# Patient Record
Sex: MALE | Race: WHITE | URBAN - METROPOLITAN AREA
[De-identification: names, ages, dates, MRNs, and addresses within clinical notes are randomized per-mention and may not be internally consistent; named-entity substitution may affect disease eponyms.]

---

## 2017-10-18 ENCOUNTER — OUTPATIENT (OUTPATIENT)
Dept: OUTPATIENT SERVICES | Facility: HOSPITAL | Age: 77
LOS: 1 days | Discharge: HOME | End: 2017-10-18

## 2017-10-18 DIAGNOSIS — J44.9 CHRONIC OBSTRUCTIVE PULMONARY DISEASE, UNSPECIFIED: ICD-10-CM

## 2017-10-18 DIAGNOSIS — I24.0 ACUTE CORONARY THROMBOSIS NOT RESULTING IN MYOCARDIAL INFARCTION: ICD-10-CM

## 2017-10-18 DIAGNOSIS — I25.10 ATHEROSCLEROTIC HEART DISEASE OF NATIVE CORONARY ARTERY WITHOUT ANGINA PECTORIS: ICD-10-CM

## 2017-10-18 DIAGNOSIS — I77.9 DISORDER OF ARTERIES AND ARTERIOLES, UNSPECIFIED: ICD-10-CM

## 2017-10-18 DIAGNOSIS — K92.2 GASTROINTESTINAL HEMORRHAGE, UNSPECIFIED: ICD-10-CM

## 2017-10-19 DIAGNOSIS — L97.521 NON-PRESSURE CHRONIC ULCER OF OTHER PART OF LEFT FOOT LIMITED TO BREAKDOWN OF SKIN: ICD-10-CM

## 2017-12-28 ENCOUNTER — TRANSCRIPTION ENCOUNTER (OUTPATIENT)
Age: 77
End: 2017-12-28

## 2017-12-28 PROBLEM — Z00.00 ENCOUNTER FOR PREVENTIVE HEALTH EXAMINATION: Status: ACTIVE | Noted: 2017-12-28

## 2018-01-17 ENCOUNTER — OUTPATIENT (OUTPATIENT)
Dept: OUTPATIENT SERVICES | Facility: HOSPITAL | Age: 78
LOS: 1 days | Discharge: HOME | End: 2018-01-17

## 2018-01-17 DIAGNOSIS — E11.9 TYPE 2 DIABETES MELLITUS WITHOUT COMPLICATIONS: ICD-10-CM

## 2018-01-17 DIAGNOSIS — I25.10 ATHEROSCLEROTIC HEART DISEASE OF NATIVE CORONARY ARTERY WITHOUT ANGINA PECTORIS: ICD-10-CM

## 2018-01-17 DIAGNOSIS — I73.9 PERIPHERAL VASCULAR DISEASE, UNSPECIFIED: ICD-10-CM

## 2018-01-17 DIAGNOSIS — K92.2 GASTROINTESTINAL HEMORRHAGE, UNSPECIFIED: ICD-10-CM

## 2018-01-17 DIAGNOSIS — I77.9 DISORDER OF ARTERIES AND ARTERIOLES, UNSPECIFIED: ICD-10-CM

## 2018-01-17 DIAGNOSIS — I24.0 ACUTE CORONARY THROMBOSIS NOT RESULTING IN MYOCARDIAL INFARCTION: ICD-10-CM

## 2018-01-17 DIAGNOSIS — J44.9 CHRONIC OBSTRUCTIVE PULMONARY DISEASE, UNSPECIFIED: ICD-10-CM

## 2018-03-07 ENCOUNTER — APPOINTMENT (OUTPATIENT)
Dept: PLASTIC SURGERY | Facility: CLINIC | Age: 78
End: 2018-03-07

## 2018-03-28 ENCOUNTER — OUTPATIENT (OUTPATIENT)
Dept: OUTPATIENT SERVICES | Facility: HOSPITAL | Age: 78
LOS: 1 days | Discharge: HOME | End: 2018-03-28

## 2018-03-28 DIAGNOSIS — E11.9 TYPE 2 DIABETES MELLITUS WITHOUT COMPLICATIONS: ICD-10-CM

## 2018-05-24 ENCOUNTER — OUTPATIENT (OUTPATIENT)
Dept: OUTPATIENT SERVICES | Facility: HOSPITAL | Age: 78
LOS: 1 days | Discharge: HOME | End: 2018-05-24

## 2018-05-24 DIAGNOSIS — K52.9 NONINFECTIVE GASTROENTERITIS AND COLITIS, UNSPECIFIED: ICD-10-CM

## 2018-08-22 ENCOUNTER — OUTPATIENT (OUTPATIENT)
Dept: OUTPATIENT SERVICES | Facility: HOSPITAL | Age: 78
LOS: 1 days | Discharge: HOME | End: 2018-08-22

## 2018-08-22 DIAGNOSIS — E11.9 TYPE 2 DIABETES MELLITUS WITHOUT COMPLICATIONS: ICD-10-CM

## 2018-08-22 DIAGNOSIS — J20.9 ACUTE BRONCHITIS, UNSPECIFIED: ICD-10-CM

## 2018-12-28 ENCOUNTER — OUTPATIENT (OUTPATIENT)
Dept: OUTPATIENT SERVICES | Facility: HOSPITAL | Age: 78
LOS: 1 days | Discharge: HOME | End: 2018-12-28

## 2018-12-28 DIAGNOSIS — Z01.818 ENCOUNTER FOR OTHER PREPROCEDURAL EXAMINATION: ICD-10-CM

## 2019-08-09 ENCOUNTER — OUTPATIENT (OUTPATIENT)
Dept: OUTPATIENT SERVICES | Facility: HOSPITAL | Age: 79
LOS: 1 days | Discharge: HOME | End: 2019-08-09

## 2019-08-09 DIAGNOSIS — D64.9 ANEMIA, UNSPECIFIED: ICD-10-CM

## 2019-08-09 DIAGNOSIS — Z00.00 ENCOUNTER FOR GENERAL ADULT MEDICAL EXAMINATION WITHOUT ABNORMAL FINDINGS: ICD-10-CM

## 2019-08-09 DIAGNOSIS — E11.9 TYPE 2 DIABETES MELLITUS WITHOUT COMPLICATIONS: ICD-10-CM

## 2019-09-06 ENCOUNTER — OUTPATIENT (OUTPATIENT)
Dept: OUTPATIENT SERVICES | Facility: HOSPITAL | Age: 79
LOS: 1 days | Discharge: HOME | End: 2019-09-06

## 2019-09-06 DIAGNOSIS — E87.5 HYPERKALEMIA: ICD-10-CM

## 2019-09-13 ENCOUNTER — APPOINTMENT (OUTPATIENT)
Dept: OPHTHALMOLOGY | Facility: CLINIC | Age: 79
End: 2019-09-13
Payer: MEDICARE

## 2019-09-13 ENCOUNTER — NON-APPOINTMENT (OUTPATIENT)
Age: 79
End: 2019-09-13

## 2019-09-13 PROCEDURE — 92002 INTRM OPH EXAM NEW PATIENT: CPT

## 2019-10-24 ENCOUNTER — APPOINTMENT (OUTPATIENT)
Dept: OPHTHALMOLOGY | Facility: CLINIC | Age: 79
End: 2019-10-24
Payer: MEDICARE

## 2019-10-24 ENCOUNTER — NON-APPOINTMENT (OUTPATIENT)
Age: 79
End: 2019-10-24

## 2019-10-24 PROCEDURE — 92012 INTRM OPH EXAM EST PATIENT: CPT

## 2020-01-09 ENCOUNTER — NON-APPOINTMENT (OUTPATIENT)
Age: 80
End: 2020-01-09

## 2020-01-09 ENCOUNTER — APPOINTMENT (OUTPATIENT)
Dept: OPHTHALMOLOGY | Facility: CLINIC | Age: 80
End: 2020-01-09
Payer: MEDICARE

## 2020-01-09 PROCEDURE — 92012 INTRM OPH EXAM EST PATIENT: CPT

## 2020-05-11 ENCOUNTER — APPOINTMENT (OUTPATIENT)
Dept: OPHTHALMOLOGY | Facility: CLINIC | Age: 80
End: 2020-05-11

## 2021-07-08 ENCOUNTER — NON-APPOINTMENT (OUTPATIENT)
Age: 81
End: 2021-07-08

## 2021-07-08 ENCOUNTER — APPOINTMENT (OUTPATIENT)
Dept: OPHTHALMOLOGY | Facility: CLINIC | Age: 81
End: 2021-07-08
Payer: MEDICARE

## 2021-07-08 PROCEDURE — 99072 ADDL SUPL MATRL&STAF TM PHE: CPT

## 2021-07-08 PROCEDURE — 92250 FUNDUS PHOTOGRAPHY W/I&R: CPT

## 2021-07-08 PROCEDURE — 92286 ANT SGM IMG I&R SPECLR MIC: CPT

## 2021-07-08 PROCEDURE — 92014 COMPRE OPH EXAM EST PT 1/>: CPT

## 2021-10-07 ENCOUNTER — NON-APPOINTMENT (OUTPATIENT)
Age: 81
End: 2021-10-07

## 2021-10-07 ENCOUNTER — APPOINTMENT (OUTPATIENT)
Dept: OPHTHALMOLOGY | Facility: CLINIC | Age: 81
End: 2021-10-07
Payer: MEDICARE

## 2021-10-07 PROCEDURE — 92012 INTRM OPH EXAM EST PATIENT: CPT

## 2022-01-18 ENCOUNTER — FORM ENCOUNTER (OUTPATIENT)
Age: 82
End: 2022-01-18

## 2022-02-17 ENCOUNTER — OUTPATIENT (OUTPATIENT)
Dept: OUTPATIENT SERVICES | Facility: HOSPITAL | Age: 82
LOS: 1 days | Discharge: HOME | End: 2022-02-17
Payer: MEDICARE

## 2022-02-17 ENCOUNTER — TRANSCRIPTION ENCOUNTER (OUTPATIENT)
Age: 82
End: 2022-02-17

## 2022-02-17 ENCOUNTER — OUTPATIENT (OUTPATIENT)
Dept: OUTPATIENT SERVICES | Facility: HOSPITAL | Age: 82
LOS: 1 days | Discharge: HOME | End: 2022-02-17

## 2022-02-17 ENCOUNTER — APPOINTMENT (OUTPATIENT)
Dept: HEMATOLOGY ONCOLOGY | Facility: CLINIC | Age: 82
End: 2022-02-17
Payer: MEDICARE

## 2022-02-17 VITALS
BODY MASS INDEX: 32.65 KG/M2 | TEMPERATURE: 98.7 F | WEIGHT: 208 LBS | DIASTOLIC BLOOD PRESSURE: 65 MMHG | HEIGHT: 67 IN | SYSTOLIC BLOOD PRESSURE: 115 MMHG | RESPIRATION RATE: 14 BRPM | HEART RATE: 86 BPM

## 2022-02-17 DIAGNOSIS — Z82.49 FAMILY HISTORY OF ISCHEMIC HEART DISEASE AND OTHER DISEASES OF THE CIRCULATORY SYSTEM: ICD-10-CM

## 2022-02-17 DIAGNOSIS — C90.00 MULTIPLE MYELOMA NOT HAVING ACHIEVED REMISSION: ICD-10-CM

## 2022-02-17 DIAGNOSIS — Z60.2 PROBLEMS RELATED TO LIVING ALONE: ICD-10-CM

## 2022-02-17 DIAGNOSIS — Z87.448 PERSONAL HISTORY OF OTHER DISEASES OF URINARY SYSTEM: ICD-10-CM

## 2022-02-17 DIAGNOSIS — Z86.79 PERSONAL HISTORY OF OTHER DISEASES OF THE CIRCULATORY SYSTEM: ICD-10-CM

## 2022-02-17 DIAGNOSIS — Z87.09 PERSONAL HISTORY OF OTHER DISEASES OF THE RESPIRATORY SYSTEM: ICD-10-CM

## 2022-02-17 DIAGNOSIS — E11.22 TYPE 2 DIABETES MELLITUS WITH DIABETIC CHRONIC KIDNEY DISEASE: ICD-10-CM

## 2022-02-17 DIAGNOSIS — Z87.891 PERSONAL HISTORY OF NICOTINE DEPENDENCE: ICD-10-CM

## 2022-02-17 DIAGNOSIS — Z78.9 OTHER SPECIFIED HEALTH STATUS: ICD-10-CM

## 2022-02-17 DIAGNOSIS — K21.9 GASTRO-ESOPHAGEAL REFLUX DISEASE W/OUT ESOPHAGITIS: ICD-10-CM

## 2022-02-17 DIAGNOSIS — Z80.0 FAMILY HISTORY OF MALIGNANT NEOPLASM OF DIGESTIVE ORGANS: ICD-10-CM

## 2022-02-17 DIAGNOSIS — Z87.438 PERSONAL HISTORY OF OTHER DISEASES OF MALE GENITAL ORGANS: ICD-10-CM

## 2022-02-17 DIAGNOSIS — Z86.39 PERSONAL HISTORY OF OTHER ENDOCRINE, NUTRITIONAL AND METABOLIC DISEASE: ICD-10-CM

## 2022-02-17 PROCEDURE — 99204 OFFICE O/P NEW MOD 45 MIN: CPT

## 2022-02-17 PROCEDURE — 77075 RADEX OSSEOUS SURVEY COMPL: CPT | Mod: 26

## 2022-02-17 RX ORDER — BUMETANIDE 1 MG/1
1 TABLET ORAL
Refills: 0 | Status: ACTIVE | COMMUNITY

## 2022-02-17 RX ORDER — EMPAGLIFLOZIN 25 MG/1
25 TABLET, FILM COATED ORAL
Refills: 0 | Status: ACTIVE | COMMUNITY

## 2022-02-17 SDOH — SOCIAL STABILITY - SOCIAL INSECURITY: PROBLEMS RELATED TO LIVING ALONE: Z60.2

## 2022-02-18 PROBLEM — Z86.39 HISTORY OF HYPERCALCEMIA: Status: RESOLVED | Noted: 2022-02-17 | Resolved: 2022-02-18

## 2022-02-18 PROBLEM — Z87.448 HISTORY OF CHRONIC KIDNEY DISEASE: Status: RESOLVED | Noted: 2022-02-17 | Resolved: 2022-02-18

## 2022-02-18 LAB
B2 MICROGLOB SERPL-MCNC: 3.6 MG/L
LDH SERPL-CCNC: 180 U/L

## 2022-02-18 NOTE — REVIEW OF SYSTEMS
[Fatigue] : fatigue [Recent Change In Weight] : ~T no recent weight change [Lower Ext Edema] : no lower extremity edema [Cough] : cough [SOB on Exertion] : shortness of breath during exertion [Joint Pain] : joint pain [Skin Wound] : no skin wound [Negative] : Allergic/Immunologic

## 2022-02-18 NOTE — HISTORY OF PRESENT ILLNESS
[de-identified] : CC: I have abnormal blood work\par \par He is here at the request of Dr. Cisse \par \par This is an 81 year old male who has a history of CHF, Afib, PVD, CAD,  COPD,  CKD, Hypercalcemia. He is here with his daughter    He had blood work done for his renal disesae with you that showed   Kappa 817 mg/L and a lambda of 17.8 with a ratio of 45.9. He has no back pain, does have SOB and has pain in his extremities. \par  \par Review of blood work\par \par Available blood work in our  institution.\par \par Most recent CBC from February 7, 2022 showed a hemoglobin of 16, platelet count of 212 and white blood cell count of 6.2.   CMP from same day His creatinine was 1.9 with a calcium of 10.3 and GFR of 32.  Hemoglobin A1c of 7.3\par  \par In January 2021 his calcium was 11.1 with a PTH of 61 and in August 2020 his calcium was 10.6 with a PTH of 61\par \par In January 2021 ferritin was 25 with iron saturation of 13%\par \par His creatinine was 1.7 in August 2020 and was 1.3 and September 2019.\par \par He had an SPEP in January 2021 that demonstrated an M spike of 0.6\par \par His most recent outpatient blood work from January 15, 2021 demonstrated a protein creatinine ratio of 1150 mg/g, presence of monoclonal protein, his SPEP was also 0.6 abdomen 3.7 total protein was normal.\par \par His PTH was 122 with a calcium of 9.5, \par \par Serum immunofixation demonstrated a monoclonal IgA band.\par \par Iron saturation was 11%\par \par His GFR is 27 with a creatinine of 2.2 and albumin was 3.8\par \par UA had positive protein.  CBC was normal\par \par His serum free light chains showed a Kappa 817 mg/L and a lambda of 17.8 with a ratio of 45.9

## 2022-02-18 NOTE — ASSESSMENT
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,      Kappa 817 mg/L and a lambda of 17.8 with a ratio of 45.9, M spoke of  0.6\par -his Hypercalcemia is chronic and PTH is elevated also less than 11.5 and his creatinine is stable also he had JENISE in 8/2020 when he had COVID and was in [facility] with CHF exacerbation ahd his Creatine has been elevated since than thus his CKD and Hypercalcemia are not likely due to Active  Myeloma \par \par Plan\par -will complete work up with serum Immunoelectrophoresis,  LDH, Skeletal Survey and a bone marrow biopsy next week\par -if has >10% plasma cells will check PET/CT as well especially if skeletal survey is negative\par -he will RTC once  above are done\par -he has shoulder surgery coming up next week

## 2022-02-18 NOTE — REASON FOR VISIT
[Initial Consultation] : an initial consultation for [Monoclonal Gammopathy] : monoclonal gammopathy [Family Member] : family member [FreeTextEntry2] : Elevated Free light chain

## 2022-02-18 NOTE — CONSULT LETTER
[Dear  ___] : Dear  [unfilled], [Consult Letter:] : I had the pleasure of evaluating your patient, [unfilled]. [Please see my note below.] : Please see my note below. [Consult Closing:] : Thank you very much for allowing me to participate in the care of this patient.  If you have any questions, please do not hesitate to contact me. [Sincerely,] : Sincerely, [FreeTextEntry3] : Jose

## 2022-02-22 LAB
ALBUMIN MFR SERPL ELPH: 55.4 %
ALBUMIN SERPL-MCNC: 3.5 G/DL
ALBUMIN/GLOB SERPL: 1.2 RATIO
ALPHA1 GLOB MFR SERPL ELPH: 4.2 %
ALPHA1 GLOB SERPL ELPH-MCNC: 0.3 G/DL
ALPHA2 GLOB MFR SERPL ELPH: 11.3 %
ALPHA2 GLOB SERPL ELPH-MCNC: 0.7 G/DL
B-GLOBULIN MFR SERPL ELPH: 19.5 %
B-GLOBULIN SERPL ELPH-MCNC: 1.2 G/DL
DEPRECATED KAPPA LC FREE/LAMBDA SER: 32.61 RATIO
GAMMA GLOB FLD ELPH-MCNC: 0.6 G/DL
GAMMA GLOB MFR SERPL ELPH: 9.6 %
IGA SER QL IEP: 691 MG/DL
IGG SER QL IEP: 568 MG/DL
IGM SER QL IEP: 55 MG/DL
INTERPRETATION SERPL IEP-IMP: NORMAL
KAPPA LC CSF-MCNC: 1.42 MG/DL
KAPPA LC SERPL-MCNC: 46.31 MG/DL
M PROTEIN MFR SERPL ELPH: 8.4 %
M PROTEIN SPEC IFE-MCNC: NORMAL
MONOCLON BAND OBS SERPL: 0.5 G/DL
PROT SERPL-MCNC: 6.4 G/DL
PROT SERPL-MCNC: 6.4 G/DL

## 2022-02-23 ENCOUNTER — APPOINTMENT (OUTPATIENT)
Dept: HEMATOLOGY ONCOLOGY | Facility: CLINIC | Age: 82
End: 2022-02-23

## 2022-03-14 DIAGNOSIS — C90.00 MULTIPLE MYELOMA NOT HAVING ACHIEVED REMISSION: ICD-10-CM

## 2022-03-14 DIAGNOSIS — Z87.448 PERSONAL HISTORY OF OTHER DISEASES OF URINARY SYSTEM: ICD-10-CM

## 2022-03-14 DIAGNOSIS — Z86.39 PERSONAL HISTORY OF OTHER ENDOCRINE, NUTRITIONAL AND METABOLIC DISEASE: ICD-10-CM

## 2022-03-24 ENCOUNTER — APPOINTMENT (OUTPATIENT)
Dept: HEMATOLOGY ONCOLOGY | Facility: CLINIC | Age: 82
End: 2022-03-24

## 2022-06-06 ENCOUNTER — NON-APPOINTMENT (OUTPATIENT)
Age: 82
End: 2022-06-06

## 2022-06-06 DIAGNOSIS — E78.00 PURE HYPERCHOLESTEROLEMIA, UNSPECIFIED: ICD-10-CM

## 2022-06-20 ENCOUNTER — APPOINTMENT (OUTPATIENT)
Dept: UROLOGY | Facility: CLINIC | Age: 82
End: 2022-06-20

## 2022-06-20 VITALS — BODY MASS INDEX: 32.42 KG/M2 | WEIGHT: 207 LBS

## 2022-06-20 VITALS — HEIGHT: 67 IN | SYSTOLIC BLOOD PRESSURE: 140 MMHG | DIASTOLIC BLOOD PRESSURE: 70 MMHG | TEMPERATURE: 97.1 F

## 2022-06-20 DIAGNOSIS — E11.9 TYPE 2 DIABETES MELLITUS W/OUT COMPLICATIONS: ICD-10-CM

## 2022-06-20 DIAGNOSIS — Z01.818 ENCOUNTER FOR OTHER PREPROCEDURAL EXAMINATION: ICD-10-CM

## 2022-06-20 DIAGNOSIS — N48.6 INDURATION PENIS PLASTICA: ICD-10-CM

## 2022-06-20 PROCEDURE — 51798 US URINE CAPACITY MEASURE: CPT | Mod: 59

## 2022-06-20 PROCEDURE — 51741 ELECTRO-UROFLOWMETRY FIRST: CPT

## 2022-06-20 PROCEDURE — 52000 CYSTOURETHROSCOPY: CPT

## 2022-06-20 PROCEDURE — 99205 OFFICE O/P NEW HI 60 MIN: CPT | Mod: 25

## 2022-06-20 PROCEDURE — 51725 SIMPLE CYSTOMETROGRAM: CPT

## 2022-06-20 NOTE — ASSESSMENT
[FreeTextEntry1] : FARIHA KHOURY \par Nov 17 1940 \par 06/20/2022 \par Procedure: Cystoscopy Location: [facility] at [address] [place] [address],\[place] [address] Surgeon: KATINA Waite M.D. Preop Diagnosis: Pre-op, BPH,obstruction/Urinary retention Postop Diagnosis:\par Same Anesthesia: 2% Intraurethral Lidocaine Jelly Medication: Levaquin Complication: None Operative Note: Discussed with the patient, risks and benefits of cystourethroscopy which includes hematuria, dysuria, stricture formation. The patient agrees to proceed with the above procedure: The patient was placed in the dorsal lithotomy position, prepped and draped in the usual standard sterile fashion with surgical bethadine soap and wash. 2% lidocaine jelly was inserted intraurethrally via the meatus. A clamp was applied to the penis and lidocaine jelly was allowed to remain in place for 5 minutes. The meatus was then intubated with a 16F flexible cystoscope. Visual cystourethroscopic examination was initiated under sterile water irrigation. The following findings were noted: The anterior urethra was normal. The Bulbar urethra was also normal. The membranous urethra was normal without any strictures. The prostatic urethra, lateral lobe and verumontanum appeared consistent with BPH. The prostatic urethra was obstructed with bilobar hypertrophy and an elevated bladder neck. Ureteral orifices were identified and clear efflux of urine was observed bilaterally. The bladder was examined in its entirety in a systematic fashion. Trabeculation observed: Moderate No mucosal abnormalities, stone, tumors, foreign bodies or diverticula identified. On retroflex, there was no significant tissue protruding into the bladder from the prostate. The patient tolerated the procedure well. He was discharged to home with PO antibiotics in stable condition.\par \par \par BLADDER SCAN:\par \par Indication: Increased frequency of urination. \par Initial Volume: 610   cc\par Post Void Residual: 100   cc\par \par Results: \par Stress urinary incontinence. \par Urinary retention\par \par Recommendations: \par No Therapy Needed.\par \par \par UROFLOWMETRY:\par \par Indication: \par Increased frequency of urination. \par Decreased force of stream\par Urinary Flow Rate:   15    cc/s\par \par Results:\par Stress urinary incontinence \par Urinary retention\par \par Recommendations: \par \par No therapy needed.\par \par Preop Diagnosis: Increased Frequency of urination. \par Postop Diagnosis: Increased Frequency of urination.\par Anesthesia: 2 % Intraurethral Lidocaine Jelly \par Medication: Ciprofloxacin\par Complications: None \par Operative Note: \par - The patient was placed in the dorsal lithotomy position and draped in the usual standard sterile fashion with surgical Betadine soap and wash. 2 % lidocaine jelly was inserted intraurethrally via the meatus. A clamp was applied to the penis and lidocaine jelly was allowed to remain in place for 5 minutes. \par \par The meatus was then intubated with a 16F flexible cystocope. Visual cystourethroscopic examination was initiated under sterile water irrigation. The findings observed are described in the cryoscopy report. \par \par With the flexible cystocope indwelling into the bladder, a sterile line of intravenous saline was connected into a manometer. This allowed us to measure the amount of fluid, pressure, and flow of the lower urinary tract. The following findings of the cystometrogram were noted. \par \par Bladder wall compliance: Normal\par Functional bladder capacity: [610] \par The patient perceived a first sensation that the bladder was filling with saline at: [275] cc\par His first urge to void was observed at [610] cc of saline. \par His post void residual was measured at [100] cc\par Bladder Scan and his flow rate peaked at 15 cc/min \par His proprioception was normal: normal \par Detrusor instability was: Not present \par Summary: \par The patient tolerated the procedure well. He was discharged to home on oral antibiotics to call if he develop difficulty with urination, bleeding, fever or chills.\par \par

## 2022-06-20 NOTE — HISTORY OF PRESENT ILLNESS
[FreeTextEntry1] : Patient here for preop evaluation prior to removal replacement of mechanically broken  inflatable penile prosthesis.  We had an extensive discussion regarding the risks and benefits.  We also discussed the preoperative instructions and he verbalized that he understood what he needed to do.

## 2022-06-28 VITALS
HEIGHT: 68 IN | SYSTOLIC BLOOD PRESSURE: 157 MMHG | TEMPERATURE: 97 F | HEART RATE: 74 BPM | DIASTOLIC BLOOD PRESSURE: 86 MMHG | RESPIRATION RATE: 18 BRPM | OXYGEN SATURATION: 95 % | WEIGHT: 210.54 LBS

## 2022-06-28 NOTE — ASU PATIENT PROFILE, ADULT - NSICDXPASTSURGICALHX_GEN_ALL_CORE_FT
PAST SURGICAL HISTORY:  H/O shoulder surgery right    History of appendectomy     History of corneal transplant     History of hernia repair abdominal    History of penile implant     S/P coronary angioplasty      PAST SURGICAL HISTORY:  H/O shoulder surgery right    History of appendectomy     History of corneal transplant     History of hernia repair abdominal    History of penile implant     History of percutaneous angioplasty     S/P coronary angioplasty

## 2022-06-28 NOTE — ASU PATIENT PROFILE, ADULT - FALL HARM RISK - UNIVERSAL INTERVENTIONS
Bed in lowest position, wheels locked, appropriate side rails in place/Call bell, personal items and telephone in reach/Instruct patient to call for assistance before getting out of bed or chair/Non-slip footwear when patient is out of bed/[place] to call system/Physically safe environment - no spills, clutter or unnecessary equipment/Purposeful Proactive Rounding/Room/bathroom lighting operational, light cord in reach

## 2022-06-28 NOTE — ASU PATIENT PROFILE, ADULT - NSICDXPASTMEDICALHX_GEN_ALL_CORE_FT
PAST MEDICAL HISTORY:  Asthma     Atrial fibrillation     CAD (coronary artery disease)     CHF (congestive heart failure)     CKD (chronic kidney disease)     COPD (chronic obstructive pulmonary disease)     DM (diabetes mellitus), type 2     History of 2019 novel coronavirus disease (COVID-19) 2020    HLD (hyperlipidemia)     HTN (hypertension)     SAMMI (obstructive sleep apnea)      PAST MEDICAL HISTORY:  Asthma     Atrial fibrillation     CAD (coronary artery disease) coronary stent x 1    CHF (congestive heart failure)     CKD (chronic kidney disease)     COPD (chronic obstructive pulmonary disease)     DM (diabetes mellitus), type 2     History of 2019 novel coronavirus disease (COVID-19) 2020    HLD (hyperlipidemia)     HTN (hypertension)     SAMMI (obstructive sleep apnea)

## 2022-06-29 ENCOUNTER — APPOINTMENT (OUTPATIENT)
Dept: UROLOGY | Facility: HOSPITAL | Age: 82
End: 2022-06-29

## 2022-06-29 ENCOUNTER — INPATIENT (INPATIENT)
Facility: HOSPITAL | Age: 82
LOS: 0 days | Discharge: HOME | DRG: 675 | End: 2022-06-30
Attending: SPECIALIST | Admitting: SPECIALIST
Payer: MEDICARE

## 2022-06-29 DIAGNOSIS — Z98.890 OTHER SPECIFIED POSTPROCEDURAL STATES: Chronic | ICD-10-CM

## 2022-06-29 DIAGNOSIS — Z87.438 PERSONAL HISTORY OF OTHER DISEASES OF MALE GENITAL ORGANS: ICD-10-CM

## 2022-06-29 DIAGNOSIS — Z90.49 ACQUIRED ABSENCE OF OTHER SPECIFIED PARTS OF DIGESTIVE TRACT: Chronic | ICD-10-CM

## 2022-06-29 DIAGNOSIS — Z98.61 CORONARY ANGIOPLASTY STATUS: Chronic | ICD-10-CM

## 2022-06-29 DIAGNOSIS — Z96.0 PRESENCE OF UROGENITAL IMPLANTS: Chronic | ICD-10-CM

## 2022-06-29 DIAGNOSIS — Z94.7 CORNEAL TRANSPLANT STATUS: Chronic | ICD-10-CM

## 2022-06-29 LAB
GAS PNL BLDA: SIGNIFICANT CHANGE UP
GLUCOSE BLDC GLUCOMTR-MCNC: 114 MG/DL — HIGH (ref 70–99)
GLUCOSE BLDC GLUCOMTR-MCNC: 172 MG/DL — HIGH (ref 70–99)
GLUCOSE BLDC GLUCOMTR-MCNC: 88 MG/DL — SIGNIFICANT CHANGE UP (ref 70–99)

## 2022-06-29 PROCEDURE — 54360 PENIS PLASTIC SURGERY: CPT | Mod: GC

## 2022-06-29 PROCEDURE — 54235 NJX CORPORA CAVERNOSA RX AGT: CPT | Mod: GC

## 2022-06-29 PROCEDURE — 71045 X-RAY EXAM CHEST 1 VIEW: CPT | Mod: 26

## 2022-06-29 PROCEDURE — 54410 REMOVE/REPLACE PENIS PROSTH: CPT | Mod: GC

## 2022-06-29 DEVICE — IMP PENILE TITAN SCROTAL 0 ANGLE 22CM
Type: IMPLANTABLE DEVICE | Status: NON-FUNCTIONAL
Removed: 2022-06-29

## 2022-06-29 DEVICE — KIT PENILE TITAN ASSEMBLY STANDARD
Type: IMPLANTABLE DEVICE | Status: NON-FUNCTIONAL
Removed: 2022-06-29

## 2022-06-29 DEVICE — SURGIFLO HEMOSTATIC MATRIX KIT
Type: IMPLANTABLE DEVICE | Status: NON-FUNCTIONAL
Removed: 2022-06-29

## 2022-06-29 DEVICE — RESERVIOR TITAN CLOVERLEAF 125CC
Type: IMPLANTABLE DEVICE | Status: NON-FUNCTIONAL
Removed: 2022-06-29

## 2022-06-29 RX ORDER — DEXTROSE 50 % IN WATER 50 %
25 SYRINGE (ML) INTRAVENOUS ONCE
Refills: 0 | Status: DISCONTINUED | OUTPATIENT
Start: 2022-06-29 | End: 2022-06-30

## 2022-06-29 RX ORDER — IPRATROPIUM/ALBUTEROL SULFATE 18-103MCG
3 AEROSOL WITH ADAPTER (GRAM) INHALATION EVERY 6 HOURS
Refills: 0 | Status: DISCONTINUED | OUTPATIENT
Start: 2022-06-29 | End: 2022-06-30

## 2022-06-29 RX ORDER — FUROSEMIDE 40 MG
40 TABLET ORAL ONCE
Refills: 0 | Status: COMPLETED | OUTPATIENT
Start: 2022-06-29 | End: 2022-06-29

## 2022-06-29 RX ORDER — DILTIAZEM HCL 120 MG
120 CAPSULE, EXT RELEASE 24 HR ORAL DAILY
Refills: 0 | Status: DISCONTINUED | OUTPATIENT
Start: 2022-06-30 | End: 2022-06-30

## 2022-06-29 RX ORDER — VANCOMYCIN HCL 1 G
1500 VIAL (EA) INTRAVENOUS ONCE
Refills: 0 | Status: COMPLETED | OUTPATIENT
Start: 2022-06-29 | End: 2022-06-29

## 2022-06-29 RX ORDER — ACETAMINOPHEN 500 MG
650 TABLET ORAL EVERY 6 HOURS
Refills: 0 | Status: DISCONTINUED | OUTPATIENT
Start: 2022-06-29 | End: 2022-06-30

## 2022-06-29 RX ORDER — OXYCODONE HYDROCHLORIDE 5 MG/1
5 TABLET ORAL
Refills: 0 | Status: DISCONTINUED | OUTPATIENT
Start: 2022-06-29 | End: 2022-06-29

## 2022-06-29 RX ORDER — SACUBITRIL AND VALSARTAN 24; 26 MG/1; MG/1
1 TABLET, FILM COATED ORAL ONCE
Refills: 0 | Status: COMPLETED | OUTPATIENT
Start: 2022-06-29 | End: 2022-06-29

## 2022-06-29 RX ORDER — HYDROMORPHONE HYDROCHLORIDE 2 MG/ML
0.25 INJECTION INTRAMUSCULAR; INTRAVENOUS; SUBCUTANEOUS ONCE
Refills: 0 | Status: DISCONTINUED | OUTPATIENT
Start: 2022-06-29 | End: 2022-06-29

## 2022-06-29 RX ORDER — HYDROMORPHONE HYDROCHLORIDE 2 MG/ML
0.5 INJECTION INTRAMUSCULAR; INTRAVENOUS; SUBCUTANEOUS ONCE
Refills: 0 | Status: DISCONTINUED | OUTPATIENT
Start: 2022-06-29 | End: 2022-06-29

## 2022-06-29 RX ORDER — ACETAMINOPHEN 500 MG
1000 TABLET ORAL ONCE
Refills: 0 | Status: COMPLETED | OUTPATIENT
Start: 2022-06-29 | End: 2022-06-29

## 2022-06-29 RX ORDER — PANTOPRAZOLE SODIUM 20 MG/1
40 TABLET, DELAYED RELEASE ORAL
Refills: 0 | Status: DISCONTINUED | OUTPATIENT
Start: 2022-06-29 | End: 2022-06-30

## 2022-06-29 RX ORDER — TAMSULOSIN HYDROCHLORIDE 0.4 MG/1
0.4 CAPSULE ORAL AT BEDTIME
Refills: 0 | Status: DISCONTINUED | OUTPATIENT
Start: 2022-06-29 | End: 2022-06-30

## 2022-06-29 RX ORDER — DEXTROSE 50 % IN WATER 50 %
12.5 SYRINGE (ML) INTRAVENOUS ONCE
Refills: 0 | Status: DISCONTINUED | OUTPATIENT
Start: 2022-06-29 | End: 2022-06-30

## 2022-06-29 RX ORDER — BUMETANIDE 0.25 MG/ML
1 INJECTION INTRAMUSCULAR; INTRAVENOUS DAILY
Refills: 0 | Status: DISCONTINUED | OUTPATIENT
Start: 2022-06-30 | End: 2022-06-30

## 2022-06-29 RX ORDER — SODIUM CHLORIDE 9 MG/ML
1000 INJECTION, SOLUTION INTRAVENOUS
Refills: 0 | Status: DISCONTINUED | OUTPATIENT
Start: 2022-06-29 | End: 2022-06-30

## 2022-06-29 RX ORDER — GENTAMICIN SULFATE 40 MG/ML
320 VIAL (ML) INJECTION ONCE
Refills: 0 | Status: DISCONTINUED | OUTPATIENT
Start: 2022-06-29 | End: 2022-06-29

## 2022-06-29 RX ORDER — IPRATROPIUM/ALBUTEROL SULFATE 18-103MCG
3 AEROSOL WITH ADAPTER (GRAM) INHALATION ONCE
Refills: 0 | Status: COMPLETED | OUTPATIENT
Start: 2022-06-29 | End: 2022-06-29

## 2022-06-29 RX ORDER — GLUCAGON INJECTION, SOLUTION 0.5 MG/.1ML
1 INJECTION, SOLUTION SUBCUTANEOUS ONCE
Refills: 0 | Status: DISCONTINUED | OUTPATIENT
Start: 2022-06-29 | End: 2022-06-30

## 2022-06-29 RX ORDER — INSULIN LISPRO 100/ML
VIAL (ML) SUBCUTANEOUS
Refills: 0 | Status: DISCONTINUED | OUTPATIENT
Start: 2022-06-29 | End: 2022-06-30

## 2022-06-29 RX ORDER — SACUBITRIL AND VALSARTAN 24; 26 MG/1; MG/1
1 TABLET, FILM COATED ORAL
Refills: 0 | Status: DISCONTINUED | OUTPATIENT
Start: 2022-06-30 | End: 2022-06-30

## 2022-06-29 RX ORDER — DEXTROSE 50 % IN WATER 50 %
15 SYRINGE (ML) INTRAVENOUS ONCE
Refills: 0 | Status: DISCONTINUED | OUTPATIENT
Start: 2022-06-29 | End: 2022-06-30

## 2022-06-29 RX ORDER — AMIODARONE HYDROCHLORIDE 400 MG/1
200 TABLET ORAL DAILY
Refills: 0 | Status: DISCONTINUED | OUTPATIENT
Start: 2022-06-30 | End: 2022-06-30

## 2022-06-29 RX ORDER — ATORVASTATIN CALCIUM 80 MG/1
40 TABLET, FILM COATED ORAL AT BEDTIME
Refills: 0 | Status: DISCONTINUED | OUTPATIENT
Start: 2022-06-29 | End: 2022-06-30

## 2022-06-29 RX ADMIN — Medication 2: at 16:15

## 2022-06-29 RX ADMIN — Medication 3 MILLILITER(S): at 11:20

## 2022-06-29 RX ADMIN — OXYCODONE HYDROCHLORIDE 5 MILLIGRAM(S): 5 TABLET ORAL at 14:41

## 2022-06-29 RX ADMIN — Medication 3 MILLILITER(S): at 19:15

## 2022-06-29 RX ADMIN — HYDROMORPHONE HYDROCHLORIDE 0.25 MILLIGRAM(S): 2 INJECTION INTRAMUSCULAR; INTRAVENOUS; SUBCUTANEOUS at 12:20

## 2022-06-29 RX ADMIN — SACUBITRIL AND VALSARTAN 1 TABLET(S): 24; 26 TABLET, FILM COATED ORAL at 23:11

## 2022-06-29 RX ADMIN — Medication 40 MILLIGRAM(S): at 11:35

## 2022-06-29 RX ADMIN — HYDROMORPHONE HYDROCHLORIDE 0.25 MILLIGRAM(S): 2 INJECTION INTRAMUSCULAR; INTRAVENOUS; SUBCUTANEOUS at 12:19

## 2022-06-29 RX ADMIN — HYDROMORPHONE HYDROCHLORIDE 0.25 MILLIGRAM(S): 2 INJECTION INTRAMUSCULAR; INTRAVENOUS; SUBCUTANEOUS at 12:35

## 2022-06-29 RX ADMIN — ATORVASTATIN CALCIUM 40 MILLIGRAM(S): 80 TABLET, FILM COATED ORAL at 22:07

## 2022-06-29 RX ADMIN — HYDROMORPHONE HYDROCHLORIDE 0.25 MILLIGRAM(S): 2 INJECTION INTRAMUSCULAR; INTRAVENOUS; SUBCUTANEOUS at 11:59

## 2022-06-29 RX ADMIN — Medication 1 TABLET(S): at 22:07

## 2022-06-29 RX ADMIN — Medication 400 MILLIGRAM(S): at 11:20

## 2022-06-29 RX ADMIN — TAMSULOSIN HYDROCHLORIDE 0.4 MILLIGRAM(S): 0.4 CAPSULE ORAL at 22:07

## 2022-06-29 RX ADMIN — OXYCODONE HYDROCHLORIDE 5 MILLIGRAM(S): 5 TABLET ORAL at 20:45

## 2022-06-29 RX ADMIN — OXYCODONE HYDROCHLORIDE 5 MILLIGRAM(S): 5 TABLET ORAL at 19:57

## 2022-06-29 RX ADMIN — Medication 3 MILLILITER(S): at 07:38

## 2022-06-29 RX ADMIN — Medication 150 MILLIGRAM(S): at 07:13

## 2022-06-29 NOTE — PROGRESS NOTE ADULT - PROBLEM SELECTOR PLAN 1
- s/p removal and reinsertion of IPP   - continue BiPAP overnight. will try to wean in the morning   - diet: regular   - OOB/IS  - SCDs

## 2022-06-29 NOTE — BRIEF OPERATIVE NOTE - NSICDXBRIEFPROCEDURE_GEN_ALL_CORE_FT
PROCEDURES:  Removal of multi-component inflatable penile prosthesis 29-Jun-2022 10:50:27  Lewis Thompson  Insertion, penile prosthesis, inflatable, multi-component 29-Jun-2022 10:50:43  Lewis Thompson

## 2022-06-29 NOTE — ASU DISCHARGE PLAN (ADULT/PEDIATRIC) - CARE PROVIDER_API CALL
Anjana Waite)  Urology  [address] [place] [address]  Phone: [phone]  Fax: [phone]  Follow Up Time:

## 2022-06-29 NOTE — ASU DISCHARGE PLAN (ADULT/PEDIATRIC) - NS MD DC FALL RISK RISK
For information on Fall & Injury Prevention, visit: [email or web address] OR  [email or web address] OR  https://www.cdc.gov/steadi/patient.html normal...

## 2022-06-29 NOTE — BRIEF OPERATIVE NOTE - OPERATION/FINDINGS
Penoscrotal incision; removal of pump and retention of indwelling reservoir. Corporotomies made bilaterally to remove indwelling cylinders. New cylinders, pump, and reservoir placed. Hemostasis achieved. NO complications. See dictation.
no

## 2022-06-29 NOTE — PROGRESS NOTE ADULT - ASSESSMENT
80yo male with PMH of penile prosthesis, asthma, atrial fibrillation, CHF, CKD, COPD, DM, HTN, HLD s/p removal and reinsertion of penile prosthesis. In PACU requiring BiPap and Lasix.

## 2022-06-29 NOTE — CHART NOTE - NSCHARTNOTEFT_GEN_A_CORE
Received to PACU accompanied by anesthesia and resident.  Sitting up in stretcher, tachypneic, on NRB.  C/o left groin/flank pain.  States breathing labored.  Lungs diminished anteriorly, slight wheeze noted posteriorly at base.  D/w Dr. Mcrae - given no bumex this AM will order lasix, ABG, and BIPAP.  BIPAP now being initiated by RT, once on BIPAP can adequately manage pain with narcotic if ofirmev doesn't provide adequate relief.  PEr Dr. Mcrae, above communicated with Dr. Ramos (anesthesia) and Dr. Garcia (anesthesia covering PACU).  D/w patient who verbalized understanding.  Dispo unclear at present.

## 2022-06-29 NOTE — ASU DISCHARGE PLAN (ADULT/PEDIATRIC) - ASU DC SPECIAL INSTRUCTIONSFT
INFLATABLE PENILE PROSTHESIS    SURGICAL WOUND: There are often lumps and bumps that can be felt in the scrotum on either or both sides up to two (2) months or more post operatively. These are of no concern and with time they will soften and disappear.  Any “black and blue” bruising areas will also resolve.  Normally, there is also swelling of the scrotum post operatively. Sometimes the tissue fluid which causes the swelling migrates to the penile skin and can look alarming; with time, all the swelling will eventually subside but may take weeks.  A scrotal support and scrotal fluffs should be worn at all times for the next few weeks, unless bathing, to minimize this swelling. You may apply an ice-pack for 15 minutes out of every hour for the first 24 -36 hours to minimize pain and swelling.    STITCHES: The stitches in the incision will dissolve and fall out by themselves. Sometimes skin stitches may open, allowing a slight gaping of the incision. This is no problem if you keep the area clean.  There is a bluish colored waterproof glue over the incision as well – the glue will peel away and fall off on its own over a couple of weeks.      DRAIN: Some patients are sent home with a drain. A drain continuously drains the surgical wound and is expected to fill with blood colored fluid. If you have a drain, the nurses will review instructions and care before you go home. Follow up in the office within the next few days for drain removal.    CATHETER: Some patients are sent home with a Thompson catheter, while others go home urinating on their own. A Thompson catheter continuously drains the urine from the bladder. If you still have a catheter, the nurses will review instructions and care before you go home. For men, you may have a prescription for lidocaine jelly to apply to the tip of your penis, as needed, for catheter related discomfort.     PAIN: You may have some intermittent pain for up to six (6) weeks post operatively. Pain does not signify any problem unless associated with fever, chills, or inability to void.  If you experience any fevers or chills please call immediately as this may be signs of an infection. You may take Tylenol (acetaminophen) 650-975mg and/or Motrin (ibuprofen) 400-600mg, both available over the counter, for pain every 6 hours as needed. Do not exceed 4000mg of Tylenol (acetaminophen) daily. You may alternate these medications such that you take one or the other every 3 hours for around the clock pain coverage.    ANTIBIOTICS: You may be given a prescription for an antibiotic, please take this medication as instructed and be sure to complete the entire course.    STOOL SOFTENERS: Do not allow yourself to become constipated as straining may cause bleeding. Take stool softeners or a laxative (ex. Miralax, Colace, Senokot, ExLax, etc), available over the counter, if taking Percocet.    ANTICOAGULATION: YOU MAY RESTART ELIQUIS ON SATURDAY 2 JULY 2022     BATHING: You may sponge bath 24 hours after surgery, but minimize water to the surgical incision and drain.    DIET: You may resume your regular diet and regular medication regimen.    ACTIVITY: No heavy lifting or strenuous exercise until you are evaluated at your post-operative appointment. Otherwise, you may return to your usual level of physical activity.    FOLLOW-UP: If you did not already schedule your post-operative appointment, please call your urologist to schedule and follow-up appointment.    CALL YOUR UROLOGIST IF: You have any bleeding that does not stop, inability to void >8 hours, fever over 100.6 F, chills, persistent nausea/vomiting, changes in your incision concerning for infection, or if your pain is not controlled on your discharge pain medications.

## 2022-06-30 ENCOUNTER — TRANSCRIPTION ENCOUNTER (OUTPATIENT)
Age: 82
End: 2022-06-30

## 2022-06-30 VITALS
TEMPERATURE: 97 F | OXYGEN SATURATION: 97 % | RESPIRATION RATE: 18 BRPM | SYSTOLIC BLOOD PRESSURE: 116 MMHG | HEART RATE: 82 BPM | DIASTOLIC BLOOD PRESSURE: 57 MMHG

## 2022-06-30 LAB
A1C WITH ESTIMATED AVERAGE GLUCOSE RESULT: 6.7 % — HIGH (ref 4–5.6)
ANION GAP SERPL CALC-SCNC: 8 MMOL/L — SIGNIFICANT CHANGE UP (ref 5–17)
BUN SERPL-MCNC: 23 MG/DL — SIGNIFICANT CHANGE UP (ref 7–23)
CALCIUM SERPL-MCNC: 9.9 MG/DL — SIGNIFICANT CHANGE UP (ref 8.4–10.5)
CHLORIDE SERPL-SCNC: 103 MMOL/L — SIGNIFICANT CHANGE UP (ref 96–108)
CO2 SERPL-SCNC: 27 MMOL/L — SIGNIFICANT CHANGE UP (ref 22–31)
CREAT SERPL-MCNC: 1.64 MG/DL — HIGH (ref 0.5–1.3)
EGFR: 42 ML/MIN/1.73M2 — LOW
ESTIMATED AVERAGE GLUCOSE: 146 MG/DL — HIGH (ref 68–114)
GLUCOSE BLDC GLUCOMTR-MCNC: 83 MG/DL — SIGNIFICANT CHANGE UP (ref 70–99)
GLUCOSE BLDC GLUCOMTR-MCNC: 90 MG/DL — SIGNIFICANT CHANGE UP (ref 70–99)
GLUCOSE SERPL-MCNC: 93 MG/DL — SIGNIFICANT CHANGE UP (ref 70–99)
HCT VFR BLD CALC: 47 % — SIGNIFICANT CHANGE UP (ref 39–50)
HGB BLD-MCNC: 14.3 G/DL — SIGNIFICANT CHANGE UP (ref 13–17)
MCHC RBC-ENTMCNC: 28.5 PG — SIGNIFICANT CHANGE UP (ref 27–34)
MCHC RBC-ENTMCNC: 30.4 GM/DL — LOW (ref 32–36)
MCV RBC AUTO: 93.6 FL — SIGNIFICANT CHANGE UP (ref 80–100)
NRBC # BLD: 0 /100 WBCS — SIGNIFICANT CHANGE UP (ref 0–0)
PLATELET # BLD AUTO: 198 K/UL — SIGNIFICANT CHANGE UP (ref 150–400)
POTASSIUM SERPL-MCNC: 4.6 MMOL/L — SIGNIFICANT CHANGE UP (ref 3.5–5.3)
POTASSIUM SERPL-SCNC: 4.6 MMOL/L — SIGNIFICANT CHANGE UP (ref 3.5–5.3)
RBC # BLD: 5.02 M/UL — SIGNIFICANT CHANGE UP (ref 4.2–5.8)
RBC # FLD: 15.6 % — HIGH (ref 10.3–14.5)
SODIUM SERPL-SCNC: 138 MMOL/L — SIGNIFICANT CHANGE UP (ref 135–145)
WBC # BLD: 8.01 K/UL — SIGNIFICANT CHANGE UP (ref 3.8–10.5)
WBC # FLD AUTO: 8.01 K/UL — SIGNIFICANT CHANGE UP (ref 3.8–10.5)

## 2022-06-30 PROCEDURE — 94640 AIRWAY INHALATION TREATMENT: CPT

## 2022-06-30 PROCEDURE — 94660 CPAP INITIATION&MGMT: CPT

## 2022-06-30 PROCEDURE — 85027 COMPLETE CBC AUTOMATED: CPT

## 2022-06-30 PROCEDURE — 82962 GLUCOSE BLOOD TEST: CPT

## 2022-06-30 PROCEDURE — 83036 HEMOGLOBIN GLYCOSYLATED A1C: CPT

## 2022-06-30 PROCEDURE — 84295 ASSAY OF SERUM SODIUM: CPT

## 2022-06-30 PROCEDURE — 82803 BLOOD GASES ANY COMBINATION: CPT

## 2022-06-30 PROCEDURE — 84132 ASSAY OF SERUM POTASSIUM: CPT

## 2022-06-30 PROCEDURE — 80048 BASIC METABOLIC PNL TOTAL CA: CPT

## 2022-06-30 PROCEDURE — 71045 X-RAY EXAM CHEST 1 VIEW: CPT

## 2022-06-30 PROCEDURE — C1813: CPT

## 2022-06-30 PROCEDURE — 36415 COLL VENOUS BLD VENIPUNCTURE: CPT

## 2022-06-30 PROCEDURE — 82330 ASSAY OF CALCIUM: CPT

## 2022-06-30 RX ORDER — BUMETANIDE 0.25 MG/ML
1 INJECTION INTRAMUSCULAR; INTRAVENOUS
Qty: 0 | Refills: 0 | DISCHARGE

## 2022-06-30 RX ORDER — DILTIAZEM HCL 120 MG
1 CAPSULE, EXT RELEASE 24 HR ORAL
Qty: 0 | Refills: 0 | DISCHARGE
Start: 2022-06-30

## 2022-06-30 RX ORDER — TAMSULOSIN HYDROCHLORIDE 0.4 MG/1
1 CAPSULE ORAL
Refills: 0 | DISCHARGE
Start: 2022-06-30

## 2022-06-30 RX ORDER — TAMSULOSIN HYDROCHLORIDE 0.4 MG/1
1 CAPSULE ORAL
Qty: 0 | Refills: 0 | DISCHARGE

## 2022-06-30 RX ORDER — AMIODARONE HYDROCHLORIDE 400 MG/1
1 TABLET ORAL
Qty: 0 | Refills: 0 | DISCHARGE

## 2022-06-30 RX ORDER — OXYCODONE HYDROCHLORIDE 5 MG/1
5 TABLET ORAL ONCE
Refills: 0 | Status: DISCONTINUED | OUTPATIENT
Start: 2022-06-30 | End: 2022-06-30

## 2022-06-30 RX ORDER — ATORVASTATIN CALCIUM 80 MG/1
1 TABLET, FILM COATED ORAL
Qty: 0 | Refills: 0 | DISCHARGE

## 2022-06-30 RX ORDER — PANTOPRAZOLE SODIUM 20 MG/1
1 TABLET, DELAYED RELEASE ORAL
Qty: 0 | Refills: 0 | DISCHARGE
Start: 2022-06-30

## 2022-06-30 RX ORDER — APIXABAN 2.5 MG/1
1 TABLET, FILM COATED ORAL
Qty: 0 | Refills: 0 | DISCHARGE

## 2022-06-30 RX ORDER — DILTIAZEM HCL 120 MG
1 CAPSULE, EXT RELEASE 24 HR ORAL
Qty: 0 | Refills: 0 | DISCHARGE

## 2022-06-30 RX ORDER — AMIODARONE HYDROCHLORIDE 400 MG/1
1 TABLET ORAL
Qty: 0 | Refills: 0 | DISCHARGE
Start: 2022-06-30

## 2022-06-30 RX ORDER — BUMETANIDE 0.25 MG/ML
1 INJECTION INTRAMUSCULAR; INTRAVENOUS
Qty: 0 | Refills: 0 | DISCHARGE
Start: 2022-06-30

## 2022-06-30 RX ORDER — PANTOPRAZOLE SODIUM 20 MG/1
1 TABLET, DELAYED RELEASE ORAL
Qty: 0 | Refills: 0 | DISCHARGE

## 2022-06-30 RX ORDER — ATORVASTATIN CALCIUM 80 MG/1
1 TABLET, FILM COATED ORAL
Qty: 0 | Refills: 0 | DISCHARGE
Start: 2022-06-30

## 2022-06-30 RX ADMIN — OXYCODONE HYDROCHLORIDE 5 MILLIGRAM(S): 5 TABLET ORAL at 11:15

## 2022-06-30 RX ADMIN — Medication 1 TABLET(S): at 06:28

## 2022-06-30 RX ADMIN — Medication 3 MILLILITER(S): at 00:14

## 2022-06-30 RX ADMIN — Medication 650 MILLIGRAM(S): at 03:26

## 2022-06-30 RX ADMIN — PANTOPRAZOLE SODIUM 40 MILLIGRAM(S): 20 TABLET, DELAYED RELEASE ORAL at 06:28

## 2022-06-30 RX ADMIN — AMIODARONE HYDROCHLORIDE 200 MILLIGRAM(S): 400 TABLET ORAL at 06:28

## 2022-06-30 RX ADMIN — Medication 3 MILLILITER(S): at 12:33

## 2022-06-30 RX ADMIN — Medication 650 MILLIGRAM(S): at 04:15

## 2022-06-30 RX ADMIN — BUMETANIDE 1 MILLIGRAM(S): 0.25 INJECTION INTRAMUSCULAR; INTRAVENOUS at 06:28

## 2022-06-30 RX ADMIN — Medication 3 MILLILITER(S): at 06:28

## 2022-06-30 RX ADMIN — OXYCODONE HYDROCHLORIDE 5 MILLIGRAM(S): 5 TABLET ORAL at 10:49

## 2022-06-30 NOTE — DISCHARGE NOTE PROVIDER - NSDCCPCAREPLAN_GEN_ALL_CORE_FT
PRINCIPAL DISCHARGE DIAGNOSIS  Diagnosis: History of erectile dysfunction  Assessment and Plan of Treatment:       SECONDARY DISCHARGE DIAGNOSES  Diagnosis: Chronic CHF  Assessment and Plan of Treatment: continue home medications. follow up with PCP    Diagnosis: Atrial fibrillation  Assessment and Plan of Treatment: continue home medications. follow up with PCP    Diagnosis: Hypertension  Assessment and Plan of Treatment: continue home medications. follow up with PCP    Diagnosis: Diabetes mellitus  Assessment and Plan of Treatment: continue home medications. follow up with PCP    Diagnosis: COPD, mild  Assessment and Plan of Treatment: continue home medications. follow up with PCP

## 2022-06-30 NOTE — DISCHARGE NOTE PROVIDER - CARE PROVIDER_API CALL
Anjana Waite)  Urology  [address] [place] [address]  Phone: [phone]  Fax: [phone]  Follow Up Time:

## 2022-06-30 NOTE — DISCHARGE NOTE NURSING/CASE MANAGEMENT/SOCIAL WORK - PATIENT PORTAL LINK FT
You can access the FollowMyHealth Patient Portal offered by [facility] by registering at the following website: [email or web address]. By joining [facility]’s FollowMyHealth portal, you will also be able to view your health information using other applications (apps) compatible with our system.

## 2022-06-30 NOTE — DISCHARGE NOTE PROVIDER - NSDCMRMEDTOKEN_GEN_ALL_CORE_FT
albuterol 5 mg/mL (0.5%) inhalation solution: 0.5 milliliter(s) inhaled 2 times a day  amiodarone 200 mg oral tablet: 1 tab(s) orally once a day  atorvastatin 40 mg oral tablet: 1 tab(s) orally once a day (at bedtime)  bumetanide 1 mg oral tablet: 1 tab(s) orally once a day  dilTIAZem 120 mg oral tablet: 1 tab(s) orally once a day  Entresto 24 mg-26 mg oral tablet: 1 tab(s) orally once a day  glimepiride 2 mg oral tablet: 1 tab(s) orally once a day  Jardiance 25 mg oral tablet: 1 tab(s) orally once a day (in the morning)  pantoprazole 40 mg oral delayed release tablet: 1 tab(s) orally once a day (before a meal)  Symbicort 160 mcg-4.5 mcg/inh inhalation aerosol: 2 puff(s) inhaled 2 times a day  tamsulosin 0.4 mg oral capsule: 1 cap(s) orally once a day (at bedtime)

## 2022-06-30 NOTE — PROGRESS NOTE ADULT - PROBLEM SELECTOR PLAN 1
- s/p removal and reinsertion of IPP   - continue BiPAP overnight. will try to wean in the morning   - diet: regular   - OOB/IS  - SCDs - s/p removal and reinsertion of IPP   - diet: regular   - OOB/IS  - SCDs  -am labs

## 2022-06-30 NOTE — PROGRESS NOTE ADULT - ASSESSMENT
80yo male with PMH of penile prosthesis, asthma, atrial fibrillation, CHF, CKD, COPD, DM, HTN, HLD s/p removal and reinsertion of penile prosthesis. In PACU requiring BiPap and Lasix.  80yo male with PMH of penile prosthesis, asthma, atrial fibrillation, CHF, CKD, COPD, DM, HTN, HLD s/p removal and reinsertion of penile prosthesis.

## 2022-06-30 NOTE — DISCHARGE NOTE NURSING/CASE MANAGEMENT/SOCIAL WORK - NSDCPEFALRISK_GEN_ALL_CORE
For information on Fall & Injury Prevention, visit: [email or web address] OR  [email or web address] OR  https://www.cdc.gov/steadi/patient.html

## 2022-06-30 NOTE — PROGRESS NOTE ADULT - SUBJECTIVE AND OBJECTIVE BOX
POST OP NOTE:  procedure: removal and insertion of penile prosthesis   Patient states has some pain in the groin. Currently on Bipap secondary to tachypnea post op. Denies, chest pain, SOB, fevers or chills.       06-29-22 @ 07:01  -  06-29-22 @ 15:56  --------------------------------------------------------  IN: 0 mL / OUT: 2740 mL / NET: -2740 mL      T(C): 36.1 (06-29-22 @ 11:09), Max: 36.1 (06-29-22 @ 11:09)  HR: 80 (06-29-22 @ 15:05) (69 - 86)  BP: 157/77 (06-29-22 @ 15:05) (127/63 - 176/75)  RR: 12 (06-29-22 @ 15:05) (12 - 37)  SpO2: 94% (06-29-22 @ 15:05) (94% - 99%)    GEN: NAD  ABD: Soft, ND, NT  : sunmer in place draining clear, yellow urine 
 AM Note    No acute events overnight.      Vital Signs Last 24 Hrs  T(C): 36.3 (06-30-22 @ 04:16), Max: 36.7 (06-29-22 @ 16:34)  T(F): 97.4 (06-30-22 @ 04:16), Max: 98 (06-29-22 @ 16:34)  HR: 84 (06-30-22 @ 04:50) (69 - 98)  BP: 111/57 (06-30-22 @ 04:16) (111/57 - 176/98)  BP(mean): 109 (06-29-22 @ 15:05) (87 - 134)  RR: 18 (06-30-22 @ 04:50) (12 - 37)  SpO2: 95% (06-30-22 @ 04:50) (93% - 99%)                 I&O's Summary    29 Jun 2022 07:01  -  30 Jun 2022 06:02  --------------------------------------------------------  IN: 360 mL / OUT: 4090 mL / NET: -3730 mL          PHYSICAL EXAM:    GEN: NAD  ABD: Soft, ND, NT  : sumner in place draining clear, yellow urine

## 2022-06-30 NOTE — DISCHARGE NOTE PROVIDER - HOSPITAL COURSE
80yo male with PMH of A.fib, CHF, HTN, CKD, SAMMI, HLD, COPD, DM s/p IPP removal and reimplant. Post operatively patient became tachypneic with increased pain. Patient was placed on BiPap and given Lasix. Patient responded well. Patient was then weaned off BiPap and brought to telemetry for monitoring. Patient's VSS overnight and is hemodynamically stable. Optimized for discharge with follow up.

## 2022-06-30 NOTE — PATIENT PROFILE ADULT - FALL HARM RISK - UNIVERSAL INTERVENTIONS
Bed in lowest position, wheels locked, appropriate side rails in place/Call bell, personal items and telephone in reach/Instruct patient to call for assistance before getting out of bed or chair/Non-slip footwear when patient is out of bed/[place] to call system/Physically safe environment - no spills, clutter or unnecessary equipment/Purposeful Proactive Rounding/Room/bathroom lighting operational, light cord in reach

## 2022-06-30 NOTE — DISCHARGE NOTE PROVIDER - NSDCCPTREATMENT_GEN_ALL_CORE_FT
PRINCIPAL PROCEDURE  Procedure: Removal of multi-component inflatable penile prosthesis  Findings and Treatment:

## 2022-06-30 NOTE — DISCHARGE NOTE PROVIDER - NSDCFUADDINST_GEN_ALL_CORE_FT
CAN RESTART ELIQUIS ON JULY 2.   please take antibiotics Dr. Waite sent to pharmacy for entire duration     Thompson Catheter Instructions:  - Please care for and empty urinary catheter bag as instructed by nurse.    General Discharge Instructions:  Use Tylenol for pain control as needed  Please resume all regular home medications unless specifically advised not to take a particular medication. Also, please take any new medications as prescribed.  Please get plenty of rest, continue to ambulate several times per day, and drink adequate amounts of fluids.     Warning Signs:  Please call your doctor if you experience the following:  *You experience new chest pain, pressure, squeezing or tightness.  *New or worsening cough, shortness of breath, or wheeze.  *If you are vomiting and cannot keep down fluids or your medications.  *You are getting dehydrated due to continued vomiting, diarrhea, or other reasons. Signs of dehydration include dry mouth, rapid heartbeat, or feeling dizzy or faint when standing.  *You see blood or dark/black material when you vomit or have a bowel movement.  *You experience burning when you urinate, have blood in your urine, or experience a discharge.  *Your pain is not improving within 8-12 hours or is not gone within 24 hours. Call or return immediately if your pain is getting worse, changes location, or moves to your chest or back.  *You have shaking chills, or fever greater than 100.4 degrees Fahrenheit.  *Any change in your symptoms, or any new symptoms that concern you.

## 2022-07-01 PROBLEM — J44.9 CHRONIC OBSTRUCTIVE PULMONARY DISEASE, UNSPECIFIED: Chronic | Status: ACTIVE | Noted: 2022-06-28

## 2022-07-01 PROBLEM — N18.9 CHRONIC KIDNEY DISEASE, UNSPECIFIED: Chronic | Status: ACTIVE | Noted: 2022-06-28

## 2022-07-01 PROBLEM — E78.5 HYPERLIPIDEMIA, UNSPECIFIED: Chronic | Status: ACTIVE | Noted: 2022-06-28

## 2022-07-01 PROBLEM — E11.9 TYPE 2 DIABETES MELLITUS WITHOUT COMPLICATIONS: Chronic | Status: ACTIVE | Noted: 2022-06-28

## 2022-07-01 PROBLEM — I25.10 ATHEROSCLEROTIC HEART DISEASE OF NATIVE CORONARY ARTERY WITHOUT ANGINA PECTORIS: Chronic | Status: ACTIVE | Noted: 2022-06-28

## 2022-07-01 PROBLEM — I50.9 HEART FAILURE, UNSPECIFIED: Chronic | Status: ACTIVE | Noted: 2022-06-28

## 2022-07-01 PROBLEM — Z86.16 PERSONAL HISTORY OF COVID-19: Chronic | Status: ACTIVE | Noted: 2022-06-28

## 2022-07-01 PROBLEM — I10 ESSENTIAL (PRIMARY) HYPERTENSION: Chronic | Status: ACTIVE | Noted: 2022-06-28

## 2022-07-01 PROBLEM — I48.91 UNSPECIFIED ATRIAL FIBRILLATION: Chronic | Status: ACTIVE | Noted: 2022-06-28

## 2022-07-01 PROBLEM — G47.33 OBSTRUCTIVE SLEEP APNEA (ADULT) (PEDIATRIC): Chronic | Status: ACTIVE | Noted: 2022-06-28

## 2022-07-01 PROBLEM — J45.909 UNSPECIFIED ASTHMA, UNCOMPLICATED: Chronic | Status: ACTIVE | Noted: 2022-06-28

## 2022-07-02 ENCOUNTER — EMERGENCY (EMERGENCY)
Facility: HOSPITAL | Age: 82
LOS: 0 days | Discharge: HOME | End: 2022-07-02
Attending: EMERGENCY MEDICINE | Admitting: EMERGENCY MEDICINE

## 2022-07-02 VITALS
HEIGHT: 68 IN | RESPIRATION RATE: 20 BRPM | DIASTOLIC BLOOD PRESSURE: 60 MMHG | WEIGHT: 205.03 LBS | TEMPERATURE: 96 F | HEART RATE: 85 BPM | SYSTOLIC BLOOD PRESSURE: 123 MMHG | OXYGEN SATURATION: 93 %

## 2022-07-02 DIAGNOSIS — R31.0 GROSS HEMATURIA: ICD-10-CM

## 2022-07-02 DIAGNOSIS — Z96.0 PRESENCE OF UROGENITAL IMPLANTS: Chronic | ICD-10-CM

## 2022-07-02 DIAGNOSIS — Z79.01 LONG TERM (CURRENT) USE OF ANTICOAGULANTS: ICD-10-CM

## 2022-07-02 DIAGNOSIS — Z95.5 PRESENCE OF CORONARY ANGIOPLASTY IMPLANT AND GRAFT: ICD-10-CM

## 2022-07-02 DIAGNOSIS — I48.91 UNSPECIFIED ATRIAL FIBRILLATION: ICD-10-CM

## 2022-07-02 DIAGNOSIS — N18.9 CHRONIC KIDNEY DISEASE, UNSPECIFIED: ICD-10-CM
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
## 2022-07-02 DIAGNOSIS — I13.0 HYPERTENSIVE HEART AND CHRONIC KIDNEY DISEASE WITH HEART FAILURE AND STAGE 1 THROUGH STAGE 4 CHRONIC KIDNEY DISEASE, OR UNSPECIFIED CHRONIC KIDNEY DISEASE: ICD-10-CM
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
## 2022-07-02 DIAGNOSIS — G47.33 OBSTRUCTIVE SLEEP APNEA (ADULT) (PEDIATRIC): ICD-10-CM

## 2022-07-02 DIAGNOSIS — T83.098A OTHER MECHANICAL COMPLICATION OF OTHER URINARY CATHETER, INITIAL ENCOUNTER: ICD-10-CM
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
## 2022-07-02 DIAGNOSIS — Z98.890 OTHER SPECIFIED POSTPROCEDURAL STATES: Chronic | ICD-10-CM

## 2022-07-02 DIAGNOSIS — Z90.49 ACQUIRED ABSENCE OF OTHER SPECIFIED PARTS OF DIGESTIVE TRACT: ICD-10-CM

## 2022-07-02 DIAGNOSIS — Z98.61 CORONARY ANGIOPLASTY STATUS: Chronic | ICD-10-CM

## 2022-07-02 DIAGNOSIS — J45.909 UNSPECIFIED ASTHMA, UNCOMPLICATED: ICD-10-CM

## 2022-07-02 DIAGNOSIS — Z94.7 CORNEAL TRANSPLANT STATUS: Chronic | ICD-10-CM

## 2022-07-02 DIAGNOSIS — Z90.49 ACQUIRED ABSENCE OF OTHER SPECIFIED PARTS OF DIGESTIVE TRACT: Chronic | ICD-10-CM

## 2022-07-02 DIAGNOSIS — J44.9 CHRONIC OBSTRUCTIVE PULMONARY DISEASE, UNSPECIFIED: ICD-10-CM

## 2022-07-02 DIAGNOSIS — Z86.16 PERSONAL HISTORY OF COVID-19: ICD-10-CM

## 2022-07-02 DIAGNOSIS — I50.9 HEART FAILURE, UNSPECIFIED: ICD-10-CM

## 2022-07-02 DIAGNOSIS — E78.5 HYPERLIPIDEMIA, UNSPECIFIED: ICD-10-CM

## 2022-07-02 DIAGNOSIS — I25.10 ATHEROSCLEROTIC HEART DISEASE OF NATIVE CORONARY ARTERY WITHOUT ANGINA PECTORIS: ICD-10-CM

## 2022-07-02 DIAGNOSIS — E11.22 TYPE 2 DIABETES MELLITUS WITH DIABETIC CHRONIC KIDNEY DISEASE: ICD-10-CM

## 2022-07-02 PROCEDURE — 99284 EMERGENCY DEPT VISIT MOD MDM: CPT

## 2022-07-02 PROCEDURE — 51703 INSERT BLADDER CATH COMPLEX: CPT

## 2022-07-02 PROCEDURE — 51703 INSERT BLADDER CATH COMPLEX: CPT | Mod: 58

## 2022-07-02 PROCEDURE — 99284 EMERGENCY DEPT VISIT MOD MDM: CPT | Mod: 25

## 2022-07-02 NOTE — ED PROVIDER NOTE - CLINICAL SUMMARY MEDICAL DECISION MAKING FREE TEXT BOX
Urology evaluated: replaced with 14 gauge catheter. Follow up in [facility] for removal.

## 2022-07-02 NOTE — ED PROVIDER NOTE - OBJECTIVE STATEMENT
80yo male with PMH of A.fib, CHF, HTN, CKD, SAMMI, HLD, COPD, DM s/p IPP removal and reimplant one month ago 82yo male with PMH of A.fib, CHF, HTN, CKD, SAMMI, HLD, COPD, DM s/p IPP removal and reimplant one week ago and dc home yesterday. patient was instructed to remove sumner catheter at home this am. patient did not deflate balloon . patient with large amount of gross blood from meatus. patient with decreased amount of bleeding now. patient denies any abdominal or back pain. no fevers.

## 2022-07-02 NOTE — ED PROVIDER NOTE - PROGRESS NOTE DETAILS
spoke with dr. olmstead of urology. martín moyer placed sumner catheter 14 [place]. patient to follow up urologist outpatient.

## 2022-07-02 NOTE — ED ADULT TRIAGE NOTE - CHIEF COMPLAINT QUOTE
"I was discharged yesterday with a catheter. the doctor told me to remove the catheter myself today. I went to remove it and didn't know there was a balloon to be deflated so I started bleeding. he didn't give me a syringe to deflate it"  pt removed sumner catheter today at 1030 am  as per pt, he was bleeding s/p cathter removal  pt denies pain to area

## 2022-07-02 NOTE — ED PROVIDER NOTE - NS ED ROS FT
Review of Systems    Constitutional: (-) fever/ chills (-)loss of appetite  Cardiovascular: (-) chest pain, (-) syncope (-) palpitations  Respiratory: (-) cough, (-) shortness of breath  Gastrointestinal: (-) vomiting, (-) diarrhea (-) abdominal pain  : (-) dysuria , +gross hematuria   neck: (-) neck pain or stiffness  Musculoskeletal:  (-) back pain, (-) joint pain   Integumentary: (-) rash, (-) swelling  Neurological: (-) headache, (-) altered mental status

## 2022-07-02 NOTE — ED ADULT NURSE NOTE - NSICDXPASTMEDICALHX_GEN_ALL_CORE_FT
PAST MEDICAL HISTORY:  Asthma     Atrial fibrillation     CAD (coronary artery disease) coronary stent x 1    CHF (congestive heart failure)     CKD (chronic kidney disease)     COPD (chronic obstructive pulmonary disease)     DM (diabetes mellitus), type 2     History of 2019 novel coronavirus disease (COVID-19) 2020    HLD (hyperlipidemia)     HTN (hypertension)     SAMMI (obstructive sleep apnea)

## 2022-07-02 NOTE — ED PROVIDER NOTE - PHYSICAL EXAMINATION
Vital Signs: I have reviewed the initial vital signs.  Constitutional: well-nourished, no acute distress, normocephalic  Eyes: PERRLA, EOMI,, clear conjunctiva  ENT: MMM,  Cardiovascular: regular rate, regular rhythm, no murmur appreciated  Respiratory: unlabored respiratory effort, clear to auscultation bilaterally  Gastrointestinal: soft, non-tender, softly  distended  abdomen, no pulsatile mass  Musculoskeletal: supple neck, no lower extremity edema, no bony tenderness  Integumentary: warm, dry, no rash  Neurologic: awake, alert, cranial nerves II-XII grossly intact, extremities’ motor and sensory functions grossly intact, no focal deficits, GCS 15  : swelling with bruising on penile shaft and maci blood slowly leaking from meatus

## 2022-07-02 NOTE — ED PROVIDER NOTE - ATTENDING APP SHARED VISIT CONTRIBUTION OF CARE
Pt reports he had a revision of his penile implant pump on June 29th at [facility]. Originally done 13 years ago. Pt was advised to remove his catheter this am, however prior to removal pt did not deflate the balloon. Immediately after removal. Pt is bleeding via the penile meatus. NO abdominal pain.

## 2022-07-02 NOTE — ED PROVIDER NOTE - PATIENT PORTAL LINK FT
You can access the FollowMyHealth Patient Portal offered by [facility] by registering at the following website: [email or web address]. By joining [facility]’s FollowMyHealth portal, you will also be able to view your health information using other applications (apps) compatible with our system.

## 2022-07-02 NOTE — PROCEDURE NOTE - NSPROCDETAILS_GEN_ALL_CORE
sterile technique, indwelling urinary device inserted/sterile technique, non-indwelling urinary device inserted/a urinary catheter insertion kit was used for all insertion materials

## 2022-07-02 NOTE — CONSULT NOTE ADULT - SUBJECTIVE AND OBJECTIVE BOX
HPI:  Patient is a 82yo male with PMH of A.fib on eliquis, CHF, HTN, CKD, SAMMI, HLD, COPD, DM, penileprosthesis placed 13 years agp and just had it redone at [facility] on 6/29/22. Patient was dc home yesterday from hospital and told to remove the sumner catheter today. Pt states was unaware he had to deflate a balloon, pulled out sumner today, and began bleeding via urethral meatus large amounts of blood. Patient reports has not voided since last nite.     PAST MEDICAL & SURGICAL HISTORY:  HTN (hypertension)    HLD (hyperlipidemia)    SAMMI (obstructive sleep apnea)    CAD (coronary artery disease)  coronary stent x 1    DM (diabetes mellitus), type 2    COPD (chronic obstructive pulmonary disease)    Asthma    Atrial fibrillation    CHF (congestive heart failure)    History of 2019 novel coronavirus disease (COVID-19)  2020    CKD (chronic kidney disease)    History of penile implant    H/O shoulder surgery  right    History of appendectomy    History of hernia repair  abdominal    History of corneal transplant    S/P coronary angioplasty    History of percutaneous angioplasty        Allergies    No Known Allergies          MEDICATIONS    eliquis  se med rec    ROS:  General:	no fever, weight loss,  chills  Skin: no rash, ulcers  Ophthalmologic: no visual changes  ENMT:	no sore throat  Respiratory and Thorax: no cough, wheeze,  sob  Cardiovascular:	no chest pain, palpitations, dizziness  Gastrointestinal:	no nausea, vomiting, diarrhea, abd pain  Genitourinary:	no dysuria, +hematuria  Musculoskeletal:	no joint pains  Neurological:	 no speech disturbance, focal weakness, numbness  Psychiatric:	no depression, anxiety, psychosis  Hematology/Lymphatics:	no anemia  Endocrine:	no polyuria, polydipsia        Vital Signs Last 24 Hrs  T(C): 35.7 (02 Jul 2022 12:19), Max: 35.7 (02 Jul 2022 12:19)  T(F): 96.3 (02 Jul 2022 12:19), Max: 96.3 (02 Jul 2022 12:19)  HR: 85 (02 Jul 2022 12:19) (85 - 85)  BP: 123/60 (02 Jul 2022 12:19) (123/60 - 123/60)  RR: 20 (02 Jul 2022 12:19) (20 - 20)  SpO2: 93% (02 Jul 2022 12:19) (93% - 93%)    PHYSICAL EXAM:      Constitutional: A&Ox4  Respiratory: cta b/l  Cardiovascular: s1 s2 rrr  Gastrointestinal: soft nt  nd + bs no rebound or guarding  Genitourinary: no cva tenderness, +blood at urethral meatus  Extremities: normal rom, no edema, calf tenderness  Neurological: no focal deficits  Skin: no rash

## 2022-07-02 NOTE — ED PROVIDER NOTE - NSFOLLOWUPINSTRUCTIONS_ED_ALL_ED_FT
Chronic Urinary Retention in Women    WHAT YOU NEED TO KNOW:    What is chronic urinary retention? Chronic urinary retention (CUR) is a long-term condition that develops when your bladder does not empty completely when you urinate. Female Urinary System         What causes CUR?     Weak bladder muscle      Blockages, such as a stone or growth      Nerve damage from diabetes, stroke, or spinal cord injuries      Certain medicines, such as narcotics, antihistamines, or antidepressants    What are the signs and symptoms of CUR?     Frequent urination, or the urge to urinate after you just finished      An urge to urinate, but your urine does not come out or dribbles out slowly and weakly      Frequent urine leaks that happen during the day or while you sleep      Discomfort or pain in your lower abdomen    How is CUR diagnosed? Your healthcare provider will ask about your health history and the medicines you take. He will press or tap on your lower abdomen. You may need any of the following tests:     A pelvic exam will be done to check for blockages. A pelvic exam will also show if your bladder, uterus, or other organs have moved out of place.      A post void residual test will show how much urine is left in your bladder after you urinate. You will be asked to urinate and then healthcare providers will use a small ultrasound machine to check the remaining urine in your bladder.      A neuro exam may be done to test your strength, balance, and movement. A neuro exam is used to find changes in your brain and nervous system.      Blood tests may be needed to check for infection and kidney function.      An ultrasound uses sound waves to show pictures on a monitor. An ultrasound may be done to show bladder stones, infection, or other problems.      A CT scan, or CAT scan, is a type of x-ray that is taken of your abdomen. The pictures may show what is causing your CUR. You may be given a dye before the pictures are taken to help healthcare providers see the pictures better. Tell the healthcare provider if you have ever had an allergic reaction to contrast dye.    How is CUR treated?     Urinary catheters: Ask your healthcare provider for more information about the type of catheter you need:   A Thompson catheter is a tube put into your bladder to drain urine into a bag. Keep the bag below your waist. This will prevent urine from flowing back into your bladder and causing an infection or other problems. Also, keep the tube free of kinks so the urine will drain properly. Do not pull on the catheter. This can cause pain and bleeding, and may cause the catheter to come out.       A suprapubic catheter is inserted into your bladder through an incision in your abdomen. It may be used when you have a long-term need for bladder drainage.      A straight catheter may be inserted several times a day if you cannot urinate on your own. You put the catheter in your urethra and remove it once your bladder is empty.      Surgery may be needed to treat the condition that is causing your CUR.       Sacral neuromodulation is a long-term treatment that may be needed if other treatments do not work. A device that works with your bladder’s nerves is used to help you urinate.     When should I seek immediate care?     You have severe abdominal pain.      You are breathing faster than usual.      Your heartbeat is faster than usual.      Your face, hands, feet, or ankles are swollen.     When should I contact my healthcare provider?     You have a fever.      You have pain when you urinate.      You see blood in your urine.      You have problems with your catheter.

## 2022-07-02 NOTE — ED ADULT NURSE NOTE - NSFALLRSKUNASSIST_ED_ALL_ED
Received bedside report from NOC nurseJanae.  Discussed POC.  Pt sleeping in bed, semi-fowlers, no s/s of acute distress, respirations even and unlabored, on room air, all lines patent, personal belongings w/in reach, safety precautions in place, assumed pt care, will CTM.   no

## 2022-07-02 NOTE — ED ADULT TRIAGE NOTE - HAVE YOU HAD A SECOND COVID-19 BOOSTER?
69yo female patient with over a year history of progressively worsening pain in her right buttock radiating down her right leg. She has had three Epidural Steroid Injections with temporary relief. She rates the pain at 5/10 when seating in a comfortable position, but with ambulation it goes to 10/10. She is taking Cymbalta without significant relief. She had an MRI and was told that she has a Synovial Cyst which has gotten larger. Surgical excision was recommended and she presents today for PSTs. No

## 2022-07-02 NOTE — ED PROVIDER NOTE - NS ED ATTENDING STATEMENT MOD
This was a shared visit with the TALIB. I reviewed and verified the documentation and independently performed the documented:

## 2022-07-02 NOTE — ED ADULT NURSE NOTE - NSICDXPASTSURGICALHX_GEN_ALL_CORE_FT
PAST SURGICAL HISTORY:  H/O shoulder surgery right    History of appendectomy     History of corneal transplant     History of hernia repair abdominal    History of penile implant     History of percutaneous angioplasty     S/P coronary angioplasty

## 2022-07-02 NOTE — CONSULT NOTE ADULT - ASSESSMENT
Patient is a 80yo male with PMH of Aharish on eliquis, CHF, HTN, CKD, SAMMI, HLD, COPD, DM, penile prosthesis placed 13 years ago and just had it redone at [facility] on 6/29/22. Patient was dc home yesterday from hospital and told to remove the sumner catheter today. Pt states was unaware he had to deflate a balloon, pulled out sumner today, and began bleeding via urethral meatus large amounts of blood. Patient reports has not voided since last nite.   -Sumner catheter placed clear urine draining, Recommend to continue sumner at least 3 days or as recommended by his urologist.  -Patient should follow up with his urologist at [facility] ASAP next week.  -Can dc pt home from ED from urology standpoint

## 2022-07-06 DIAGNOSIS — T83.410A BREAKDOWN (MECHANICAL) OF IMPLANTED PENILE PROSTHESIS, INITIAL ENCOUNTER: ICD-10-CM

## 2022-07-06 DIAGNOSIS — I12.9 HYPERTENSIVE CHRONIC KIDNEY DISEASE WITH STAGE 1 THROUGH STAGE 4 CHRONIC KIDNEY DISEASE, OR UNSPECIFIED CHRONIC KIDNEY DISEASE: ICD-10-CM

## 2022-07-06 DIAGNOSIS — G47.30 SLEEP APNEA, UNSPECIFIED: ICD-10-CM

## 2022-07-06 DIAGNOSIS — N18.9 CHRONIC KIDNEY DISEASE, UNSPECIFIED: ICD-10-CM

## 2022-07-06 DIAGNOSIS — I25.10 ATHEROSCLEROTIC HEART DISEASE OF NATIVE CORONARY ARTERY WITHOUT ANGINA PECTORIS: ICD-10-CM

## 2022-07-06 DIAGNOSIS — J44.9 CHRONIC OBSTRUCTIVE PULMONARY DISEASE, UNSPECIFIED: ICD-10-CM
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
## 2022-07-06 DIAGNOSIS — E78.5 HYPERLIPIDEMIA, UNSPECIFIED: ICD-10-CM

## 2022-07-06 DIAGNOSIS — I48.91 UNSPECIFIED ATRIAL FIBRILLATION: ICD-10-CM

## 2022-07-06 DIAGNOSIS — R06.82 TACHYPNEA, NOT ELSEWHERE CLASSIFIED: ICD-10-CM

## 2022-07-06 DIAGNOSIS — E11.22 TYPE 2 DIABETES MELLITUS WITH DIABETIC CHRONIC KIDNEY DISEASE: ICD-10-CM

## 2022-07-06 DIAGNOSIS — N52.9 MALE ERECTILE DYSFUNCTION, UNSPECIFIED: ICD-10-CM
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
## 2022-07-06 DIAGNOSIS — Z92.29 PERSONAL HISTORY OF OTHER DRUG THERAPY: ICD-10-CM

## 2022-07-13 ENCOUNTER — APPOINTMENT (OUTPATIENT)
Dept: UROLOGY | Facility: CLINIC | Age: 82
End: 2022-07-13

## 2022-07-13 VITALS
WEIGHT: 205 LBS | SYSTOLIC BLOOD PRESSURE: 140 MMHG | TEMPERATURE: 98.1 F | HEIGHT: 67 IN | BODY MASS INDEX: 32.18 KG/M2 | DIASTOLIC BLOOD PRESSURE: 80 MMHG

## 2022-07-13 DIAGNOSIS — R33.9 RETENTION OF URINE, UNSPECIFIED: ICD-10-CM

## 2022-07-13 PROCEDURE — 99024 POSTOP FOLLOW-UP VISIT: CPT

## 2022-07-13 PROCEDURE — 51798 US URINE CAPACITY MEASURE: CPT

## 2022-07-13 NOTE — HISTORY OF PRESENT ILLNESS
[FreeTextEntry1] : The patient is 2 weeks status post insertion of the multicomponent inflatable penile prosthesis.  The patient removed the Thompson catheter without deflating the balloon on the third day and developed gross hematuria and had to be recatheterized.  He voids well currently.

## 2022-08-04 ENCOUNTER — APPOINTMENT (OUTPATIENT)
Dept: UROLOGY | Facility: CLINIC | Age: 82
End: 2022-08-04

## 2022-08-04 VITALS
BODY MASS INDEX: 32.18 KG/M2 | WEIGHT: 205 LBS | HEIGHT: 67 IN | SYSTOLIC BLOOD PRESSURE: 130 MMHG | TEMPERATURE: 98.4 F | DIASTOLIC BLOOD PRESSURE: 72 MMHG

## 2022-08-04 DIAGNOSIS — T83.410D BREAKDOWN (MECHANICAL) OF IMPLANTED PENILE PROSTHESIS, SUBSEQUENT ENCOUNTER: ICD-10-CM

## 2022-08-04 DIAGNOSIS — Z48.816 ENCOUNTER FOR SURGICAL AFTERCARE FOLLOWING SURGERY ON THE GENITOURINARY SYSTEM: ICD-10-CM

## 2022-08-04 DIAGNOSIS — R46.89 OTHER SYMPTOMS AND SIGNS INVOLVING APPEARANCE AND BEHAVIOR: ICD-10-CM

## 2022-08-04 DIAGNOSIS — Z96.0 PRESENCE OF UROGENITAL IMPLANTS: ICD-10-CM

## 2022-08-04 PROCEDURE — 51798 US URINE CAPACITY MEASURE: CPT

## 2022-08-04 PROCEDURE — 99024 POSTOP FOLLOW-UP VISIT: CPT

## 2022-08-04 NOTE — HISTORY OF PRESENT ILLNESS
[FreeTextEntry1] : The patient is 4 weeks status post removal replacement of a multicomponent inflatable penile prosthesis.  He complains that the pump in the scrotum is bothering him and that he wants to have the pump removed.  He also is very confused and states that his original implant had the pump in the lower abdomen.  He does not remember having to inflate the implant with the pump in the scrotum he did only had a penile implant for many years.  He also admits that he did not read any of the postop instructions including how to remove the Thompson catheter how to inflate deflate.  He does not follow instructions does not read any documents that are provided to him and then complains that he does not understand what is occurring to him.

## 2022-08-04 NOTE — ASSESSMENT
[FreeTextEntry1] : BLADDER SCAN:\par Indication: Increased frequency of urination. \par Initial Volume:    cc\par PVR: 0  cc\par Results: Stress urinary incontinence. \par Recommendations: No Therapy Needed.\par

## 2022-08-18 ENCOUNTER — INPATIENT (INPATIENT)
Facility: HOSPITAL | Age: 82
LOS: 2 days | Discharge: HOME HEALTH | End: 2022-08-21
Attending: HOSPITALIST | Admitting: HOSPITALIST

## 2022-08-18 VITALS
SYSTOLIC BLOOD PRESSURE: 178 MMHG | DIASTOLIC BLOOD PRESSURE: 86 MMHG | WEIGHT: 220.02 LBS | HEIGHT: 68 IN | OXYGEN SATURATION: 92 % | RESPIRATION RATE: 18 BRPM | TEMPERATURE: 96 F | HEART RATE: 77 BPM

## 2022-08-18 DIAGNOSIS — Z98.890 OTHER SPECIFIED POSTPROCEDURAL STATES: Chronic | ICD-10-CM

## 2022-08-18 DIAGNOSIS — Z98.61 CORONARY ANGIOPLASTY STATUS: Chronic | ICD-10-CM

## 2022-08-18 DIAGNOSIS — Z90.49 ACQUIRED ABSENCE OF OTHER SPECIFIED PARTS OF DIGESTIVE TRACT: Chronic | ICD-10-CM

## 2022-08-18 DIAGNOSIS — Z94.7 CORNEAL TRANSPLANT STATUS: Chronic | ICD-10-CM

## 2022-08-18 DIAGNOSIS — Z96.0 PRESENCE OF UROGENITAL IMPLANTS: Chronic | ICD-10-CM

## 2022-08-18 PROCEDURE — 99285 EMERGENCY DEPT VISIT HI MDM: CPT

## 2022-08-18 PROCEDURE — 71045 X-RAY EXAM CHEST 1 VIEW: CPT | Mod: 26

## 2022-08-18 PROCEDURE — 72170 X-RAY EXAM OF PELVIS: CPT | Mod: 26

## 2022-08-18 PROCEDURE — 99053 MED SERV 10PM-8AM 24 HR FAC: CPT

## 2022-08-18 RX ORDER — TETANUS TOXOID, REDUCED DIPHTHERIA TOXOID AND ACELLULAR PERTUSSIS VACCINE, ADSORBED 5; 2.5; 8; 8; 2.5 [IU]/.5ML; [IU]/.5ML; UG/.5ML; UG/.5ML; UG/.5ML
0.5 SUSPENSION INTRAMUSCULAR ONCE
Refills: 0 | Status: COMPLETED | OUTPATIENT
Start: 2022-08-18 | End: 2022-08-18

## 2022-08-18 RX ADMIN — TETANUS TOXOID, REDUCED DIPHTHERIA TOXOID AND ACELLULAR PERTUSSIS VACCINE, ADSORBED 0.5 MILLILITER(S): 5; 2.5; 8; 8; 2.5 SUSPENSION INTRAMUSCULAR at 23:11

## 2022-08-18 NOTE — CONSULT NOTE ADULT - SUBJECTIVE AND OBJECTIVE BOX
TRAUMA ACTIVATION LEVEL:  CODE / ALERT  / CONSULT  ACTIVATED BY: EMS**  /  ED**  INTUBATED: YES** / NO**      MECHANISM OF INJURY:   [] Blunt     [] MVC	  [x] Fall	  [] Pedestrian Struck	  [] Motorcycle     [] Assault     [] Bicycle collision    [] Sports injury    [] Penetrating    [] Gun Shot Wound      [] Stab Wound    GCS: 15 	E: 4	V: 5	M: 6    HPI:  80yo male with PMHx of A.fib on Eliquis, CHF, HTN, CKD, SAMMI, HLD, COPD, DM s/p IPP removal and reimplant seen as Trauma Alert s/p fall. +ht, -loc. Patient reports he has been dizzy for the last few days and tonight he was in the bathroom felt dizzy and fell, hitting his head on a toilet paper rubalcava. He is c/o of some headache and right elbow tenderness. Trauma assessment in ED: ABCs intact , GCS 15 , AAOx3.    PAST MEDICAL & SURGICAL HISTORY:  HTN (hypertension)  HLD (hyperlipidemia)  SAMMI (obstructive sleep apnea)  CAD (coronary artery disease)  coronary stent x 1  DM (diabetes mellitus), type 2  COPD (chronic obstructive pulmonary disease)  Asthma  Atrial fibrillation  CHF (congestive heart failure)  History of 2019 novel coronavirus disease (COVID-19)  2020  CKD (chronic kidney disease)  History of penile implant  H/O shoulder surgery  right  History of appendectomy  History of hernia repair  abdominal  History of corneal transplant  S/P coronary angioplasty  History of percutaneous angioplasty    Allergies    No Known Allergies    Intolerances    Home Medications:  albuterol 5 mg/mL (0.5%) inhalation solution: 0.5 milliliter(s) inhaled 2 times a day (02 Jul 2022 12:20)  amiodarone 200 mg oral tablet: 1 tab(s) orally once a day (02 Jul 2022 12:20)  atorvastatin 40 mg oral tablet: 1 tab(s) orally once a day (at bedtime) (02 Jul 2022 12:20)  bumetanide 1 mg oral tablet: 1 tab(s) orally once a day (02 Jul 2022 12:20)  dilTIAZem 120 mg oral tablet: 1 tab(s) orally once a day (02 Jul 2022 12:20)  Entresto 24 mg-26 mg oral tablet: 1 tab(s) orally once a day (02 Jul 2022 12:20)  glimepiride 2 mg oral tablet: 1 tab(s) orally once a day (02 Jul 2022 12:20)  Jardiance 25 mg oral tablet: 1 tab(s) orally once a day (in the morning) (02 Jul 2022 12:20)  pantoprazole 40 mg oral delayed release tablet: 1 tab(s) orally once a day (before a meal) (02 Jul 2022 12:20)  Symbicort 160 mcg-4.5 mcg/inh inhalation aerosol: 2 puff(s) inhaled 2 times a day (02 Jul 2022 12:20)  tamsulosin 0.4 mg oral capsule: 1 cap(s) orally once a day (at bedtime) (02 Jul 2022 12:20)      ROS: 10-system review is otherwise negative except HPI above.      Primary Survey:    A - airway intact  B - bilateral breath sounds and good chest rise  C - palpable pulses in all extremities  D - GCS 15 on arrival, CACERES  Exposure obtained    Vital Signs Last 24 Hrs  T(C): 35.6 (18 Aug 2022 22:39), Max: 35.6 (18 Aug 2022 22:39)  T(F): 96.1 (18 Aug 2022 22:39), Max: 96.1 (18 Aug 2022 22:39)  HR: 77 (18 Aug 2022 22:39) (77 - 77)  BP: 178/86 (18 Aug 2022 22:39) (178/86 - 178/86)  RR: 18 (18 Aug 2022 22:39) (18 - 18)  SpO2: 92% (18 Aug 2022 22:39) (92% - 92%)    Parameters below as of 18 Aug 2022 22:39  Patient On (Oxygen Delivery Method): room air    Secondary Survey:   General: NAD  HEENT: Normocephalic, atraumatic, EOMI, PEERLA. no scalp lacerations   Neck: Soft, midline trachea. no c-spine tenderness  Chest: No chest wall tenderness, no subcutaneous emphysema   Cardiac: S1, S2, RRR  Respiratory: Bilateral breath sounds, clear and equal bilaterally  Abdomen: Soft, non-distended, minimal LLQ tenderness, no rebound, no guarding.  Groin: Normal appearing, pelvis stable   Ext:  skin tear over the right elbow. Moving b/l upper and lower extremities. Palpable Radial b/l UE, b/l DP palpable in LE.   Back: No T/L/S spine tenderness, No palpable runoff/stepoff/deformity  Rectal: No maci blood, MIGDALIA with good tone    ACCESS / DEVICES:  [x ] Peripheral IV  [ ] Central Venous Line	[ ] R	[ ] L	[ ] IJ	[ ] Fem	[ ] SC	Placed:   [ ] Arterial Line		[ ] R	[ ] L	[ ] Fem	[ ] Rad	[ ] Ax	Placed:   [ ] PICC:					[ ] Mediport  [ ] Urinary Catheter,  Date Placed:   [ ] Chest tube: [ ] Right, [ ] Left  [ ] JULIO CESAR/Adrián Drains    Labs:  pending      RADIOLOGY & ADDITIONAL STUDIES:  pending  ---------------------------------------------------------------------------------------   TRAUMA ACTIVATION LEVEL:  CODE / ALERT  / CONSULT  ACTIVATED BY: EMS**  /  ED**  INTUBATED: YES** / NO**      MECHANISM OF INJURY:   [] Blunt     [] MVC	  [x] Fall	  [] Pedestrian Struck	  [] Motorcycle     [] Assault     [] Bicycle collision    [] Sports injury    [] Penetrating    [] Gun Shot Wound      [] Stab Wound    GCS: 15 	E: 4	V: 5	M: 6    HPI:  82yo male with PMHx of A.fib on Eliquis, CHF, HTN, CKD, SAMMI, HLD, COPD, DM s/p IPP removal and reimplant seen as Trauma Alert s/p fall. +ht, -loc. Patient reports he has been dizzy for the last few days and tonight he was in the bathroom felt dizzy and fell, hitting his head on a toilet paper rubalcava. He is c/o of some headache and right elbow pain. Trauma assessment in ED: ABCs intact , GCS 15 , AAOx3.    PAST MEDICAL & SURGICAL HISTORY:  HTN (hypertension)  HLD (hyperlipidemia)  SAMMI (obstructive sleep apnea)  CAD (coronary artery disease)  coronary stent x 1  DM (diabetes mellitus), type 2  COPD (chronic obstructive pulmonary disease)  Asthma  Atrial fibrillation  CHF (congestive heart failure)  History of 2019 novel coronavirus disease (COVID-19)  2020  CKD (chronic kidney disease)  History of penile implant  H/O shoulder surgery  right  History of appendectomy  History of hernia repair  abdominal  History of corneal transplant  S/P coronary angioplasty  History of percutaneous angioplasty    Allergies    No Known Allergies    Intolerances    Home Medications:  albuterol 5 mg/mL (0.5%) inhalation solution: 0.5 milliliter(s) inhaled 2 times a day (02 Jul 2022 12:20)  amiodarone 200 mg oral tablet: 1 tab(s) orally once a day (02 Jul 2022 12:20)  atorvastatin 40 mg oral tablet: 1 tab(s) orally once a day (at bedtime) (02 Jul 2022 12:20)  bumetanide 1 mg oral tablet: 1 tab(s) orally once a day (02 Jul 2022 12:20)  dilTIAZem 120 mg oral tablet: 1 tab(s) orally once a day (02 Jul 2022 12:20)  Entresto 24 mg-26 mg oral tablet: 1 tab(s) orally once a day (02 Jul 2022 12:20)  glimepiride 2 mg oral tablet: 1 tab(s) orally once a day (02 Jul 2022 12:20)  Jardiance 25 mg oral tablet: 1 tab(s) orally once a day (in the morning) (02 Jul 2022 12:20)  pantoprazole 40 mg oral delayed release tablet: 1 tab(s) orally once a day (before a meal) (02 Jul 2022 12:20)  Symbicort 160 mcg-4.5 mcg/inh inhalation aerosol: 2 puff(s) inhaled 2 times a day (02 Jul 2022 12:20)  tamsulosin 0.4 mg oral capsule: 1 cap(s) orally once a day (at bedtime) (02 Jul 2022 12:20)      ROS: 10-system review is otherwise negative except HPI above.      Primary Survey:    A - airway intact  B - bilateral breath sounds and good chest rise  C - palpable pulses in all extremities  D - GCS 15 on arrival, CACERES  Exposure obtained    Vital Signs Last 24 Hrs  T(C): 35.6 (18 Aug 2022 22:39), Max: 35.6 (18 Aug 2022 22:39)  T(F): 96.1 (18 Aug 2022 22:39), Max: 96.1 (18 Aug 2022 22:39)  HR: 77 (18 Aug 2022 22:39) (77 - 77)  BP: 178/86 (18 Aug 2022 22:39) (178/86 - 178/86)  RR: 18 (18 Aug 2022 22:39) (18 - 18)  SpO2: 92% (18 Aug 2022 22:39) (92% - 92%)    Parameters below as of 18 Aug 2022 22:39  Patient On (Oxygen Delivery Method): room air    Secondary Survey:   General: NAD  HEENT: Normocephalic, atraumatic, EOMI, PEERLA. no scalp lacerations   Neck: Soft, midline trachea. no c-spine tenderness  Chest: No chest wall tenderness, no subcutaneous emphysema   Cardiac: S1, S2, RRR  Respiratory: Bilateral breath sounds, clear and equal bilaterally  Abdomen: Soft, non-distended, minimal LLQ tenderness, no rebound, no guarding.  Groin: Normal appearing, pelvis stable   Ext:  skin tear over the right elbow. Moving b/l upper and lower extremities. Palpable Radial b/l UE, b/l DP palpable in LE.   Back: No T/L/S spine tenderness, No palpable runoff/stepoff/deformity  Rectal: No maci blood, MIGDALIA with good tone    ACCESS / DEVICES:  [x ] Peripheral IV  [ ] Central Venous Line	[ ] R	[ ] L	[ ] IJ	[ ] Fem	[ ] SC	Placed:   [ ] Arterial Line		[ ] R	[ ] L	[ ] Fem	[ ] Rad	[ ] Ax	Placed:   [ ] PICC:					[ ] Mediport  [ ] Urinary Catheter,  Date Placed:   [ ] Chest tube: [ ] Right, [ ] Left  [ ] JULIO CESAR/Adrián Drains    Labs:  pending      RADIOLOGY & ADDITIONAL STUDIES:  pending  ---------------------------------------------------------------------------------------   TRAUMA ACTIVATION LEVEL:  CODE / ALERT  / CONSULT  ACTIVATED BY: EMS**  /  ED**  INTUBATED: YES** / NO**      MECHANISM OF INJURY:   [] Blunt     [] MVC	  [x] Fall	  [] Pedestrian Struck	  [] Motorcycle     [] Assault     [] Bicycle collision    [] Sports injury    [] Penetrating    [] Gun Shot Wound      [] Stab Wound    GCS: 15 	E: 4	V: 5	M: 6    HPI:  82yo male with PMHx of A.fib on Eliquis, CHF, HTN, CKD, SAMMI, HLD, COPD, DM s/p IPP removal and reimplant seen as Trauma Alert s/p fall. +ht, -loc. Patient reports he has been dizzy for the last few days and tonight he was in the bathroom felt dizzy and fell, hitting his head on a toilet paper rubalcava. He is c/o of some headache and right elbow pain. Trauma assessment in ED: ABCs intact , GCS 15 , AAOx3.    PAST MEDICAL & SURGICAL HISTORY:  HTN (hypertension)  HLD (hyperlipidemia)  SAMMI (obstructive sleep apnea)  CAD (coronary artery disease)  coronary stent x 1  DM (diabetes mellitus), type 2  COPD (chronic obstructive pulmonary disease)  Asthma  Atrial fibrillation  CHF (congestive heart failure)  History of 2019 novel coronavirus disease (COVID-19)    CKD (chronic kidney disease)  History of penile implant  H/O shoulder surgery  right  History of appendectomy  History of hernia repair  abdominal  History of corneal transplant  S/P coronary angioplasty  History of percutaneous angioplasty    Allergies    No Known Allergies    Intolerances    Home Medications:  albuterol 5 mg/mL (0.5%) inhalation solution: 0.5 milliliter(s) inhaled 2 times a day (2022 12:20)  amiodarone 200 mg oral tablet: 1 tab(s) orally once a day (2022 12:20)  atorvastatin 40 mg oral tablet: 1 tab(s) orally once a day (at bedtime) (2022 12:20)  bumetanide 1 mg oral tablet: 1 tab(s) orally once a day (2022 12:20)  dilTIAZem 120 mg oral tablet: 1 tab(s) orally once a day (2022 12:20)  Entresto 24 mg-26 mg oral tablet: 1 tab(s) orally once a day (2022 12:20)  glimepiride 2 mg oral tablet: 1 tab(s) orally once a day (2022 12:20)  Jardiance 25 mg oral tablet: 1 tab(s) orally once a day (in the morning) (2022 12:20)  pantoprazole 40 mg oral delayed release tablet: 1 tab(s) orally once a day (before a meal) (2022 12:20)  Symbicort 160 mcg-4.5 mcg/inh inhalation aerosol: 2 puff(s) inhaled 2 times a day (2022 12:20)  tamsulosin 0.4 mg oral capsule: 1 cap(s) orally once a day (at bedtime) (2022 12:20)      ROS: 10-system review is otherwise negative except HPI above.      Primary Survey:    A - airway intact  B - bilateral breath sounds and good chest rise  C - palpable pulses in all extremities  D - GCS 15 on arrival, CACERES  Exposure obtained    Vital Signs Last 24 Hrs  T(C): 35.6 (18 Aug 2022 22:39), Max: 35.6 (18 Aug 2022 22:39)  T(F): 96.1 (18 Aug 2022 22:39), Max: 96.1 (18 Aug 2022 22:39)  HR: 77 (18 Aug 2022 22:39) (77 - 77)  BP: 178/86 (18 Aug 2022 22:39) (178/86 - 178/86)  RR: 18 (18 Aug 2022 22:39) (18 - 18)  SpO2: 92% (18 Aug 2022 22:39) (92% - 92%)    Parameters below as of 18 Aug 2022 22:39  Patient On (Oxygen Delivery Method): room air    Secondary Survey:   General: NAD  HEENT: Normocephalic, atraumatic, EOMI, PEERLA. no scalp lacerations   Neck: Soft, midline trachea. no c-spine tenderness  Chest: No chest wall tenderness, no subcutaneous emphysema   Cardiac: S1, S2, RRR  Respiratory: Bilateral breath sounds, clear and equal bilaterally  Abdomen: Soft, non-distended, minimal LLQ tenderness, no rebound, no guarding.  Groin: Normal appearing, pelvis stable   Ext:  skin tear over the right elbow. Moving b/l upper and lower extremities. Palpable Radial b/l UE, b/l DP palpable in LE.   Back: No T/L/S spine tenderness, No palpable runoff/stepoff/deformity  Rectal: No maci blood, MIGDALIA with good tone    ACCESS / DEVICES:  [x ] Peripheral IV  [ ] Central Venous Line	[ ] R	[ ] L	[ ] IJ	[ ] Fem	[ ] SC	Placed:   [ ] Arterial Line		[ ] R	[ ] L	[ ] Fem	[ ] Rad	[ ] Ax	Placed:   [ ] PICC:					[ ] Mediport  [ ] Urinary Catheter,  Date Placed:   [ ] Chest tube: [ ] Right, [ ] Left  [ ] JULIO CESAR/Adrián Drains    LABS  CBC ( 23:50)                              13.6<L>                         6.16    )----------------(  244        58.2  % Neutrophils, 23.4  % Lymphocytes, ANC: 3.59                                43.2      BMP ( @ 23:50)             150<H>  |  111<H>  |  18    		Ca++ --      Ca 9.4                ---------------------------------( 109<H>		Mg --                 4.6     |  25      |  1.7<H>			Ph --        LFTs ( @ 23:50)      TPro 6.5 / Alb 3.8 / TBili 0.2 / DBili -- / AST 22 / ALT 12 / AlkPhos 93    Coags ( @ 23:50)  aPTT 31.3 / INR 1.14 / PT 13.10<H>    Cardiac Markers ( 23:50)     HSTrop: -- / CKMB: -- / CK: 113    ABG ( 23:50)      /  /  /  /  / %     Lactate:  0.9      --------------------------------------------------------------------------------------------    MICROBIOLOGY  Urinalysis ( @ 02:50):     Color: Light Yellow / Appearance: Clear / S.031<H> / pH: 8.0 / Gluc: >= 1000 mg/dL<!> / Ketones: Negative / Bili: Negative / Urobili: <2 mg/dL / Protein :30 mg/dL<!> / Nitrites: Negative / Leuk.Est: Negative / RBC: 1 / WBC: 1 / Sq Epi:  / Non Sq Epi: 1 / Bacteria Negative         RADIOLOGY & ADDITIONAL STUDIES:  < from: CT Abdomen and Pelvis w/ IV Cont (22 @ 01:14) >    IMPRESSION:    No CT evidence of acute intrathoracic, intra-abdominal, or pelvic   pathology.    Cystic replacement in the pancreatic body appears possibly on the basis   of main duct dilatation measuring up to 1.1 cm, new from prior.   Outpatient contrast-enhanced pancreatic MRI/MRCP is recommended for   further evaluation as neoplasm is not excluded.    Mild thickening of the right apical/upper lobe subpleural reticulation   and linear opacification in the setting of emphysema. While findings   likely reflect apical fibrosis/scarring, recommend 3 month follow-up   chest CT for reevaluate given no priors for comparison.    1.3 cm left thyroid gland nodule. Can consider outpatient thyroid   ultrasound for further evaluation.    < end of copied text >  < from: CT Head No Cont (22 @ 00:40) >  IMPRESSION:    No acute intracranial pathology or calvarial fracture.    < end of copied text >  < from: CT Cervical Spine No Cont (22 @ 00:59) >  IMPRESSION:    No acute displaced fracture or facet joint dislocation.    Multilevel degenerative changes as detailed above.    < end of copied text >    ---------------------------------------------------------------------------------------

## 2022-08-18 NOTE — CONSULT NOTE ADULT - ASSESSMENT
ASSESSMENT:  82yo male with PMHx of RAFFIfib on Eliquis, CHF, HTN, CKD, SAMMI, HLD, COPD, DM s/p IPP removal and reimplant seen as Trauma Alert s/p fall. +ht, -loc. Patient reports he has been dizzy for the last few days and tonight he was in the bathroom felt dizzy and fell, hitting his head on a toilet paper rubalcava. He is c/o of some headache and right elbow tenderness.   external signs of trauma include skin tear over the right elbow.  Trauma assessment in ED: ABCs intact , GCS 15 , AAOx3,  CACERES.     Injuries identified:   -     PLAN:   - Trauma Labs: (CBC, BMP, Coags, T&S, UA, EtOH level)  Additional studies:  EKG  Utox    Trauma Imaging to include the following:  - CXR, Pelvic Xray  - CT Head,  CT C-spine, CT Chest, CT Abd/Pelvis  - Extremity films: None    Disposition pending results of above labs and imaging  Above plan discussed with Trauma attending, Dr. Leung , patient, patient family, and ED team  --------------------------------------------------------------------------------------  08-18-22 @ 23:11 ASSESSMENT:  80yo male with PMHx of RAFFIfib on Eliquis, CHF, HTN, CKD, SAMMI, HLD, COPD, DM s/p IPP removal and reimplant seen as Trauma Alert s/p fall. +ht, -loc. Patient reports he has been dizzy for the last few days and tonight he was in the bathroom felt dizzy and fell, hitting his head on a toilet paper rubalcava. He is c/o of some headache and right elbow tenderness.   external signs of trauma include skin tear over the right elbow.  Trauma assessment in ED: ABCs intact , GCS 15 , AAOx3,  CACERES.     Injuries identified:   - none    PLAN:   - Trauma Labs: (CBC, BMP, Coags, T&S, UA, EtOH level)  Additional studies:  EKG  Utox    Trauma Imaging to include the following:  - CXR, Pelvic Xray  - CT Head,  CT C-spine, CT Chest, CT Abd/Pelvis  - Extremity films: None    Disposition pending results of above labs and imaging  Above plan discussed with Trauma attending, Dr. Leung , patient, patient family, and ED team  --------------------------------------------------------------------------------------  08-18-22 @ 23:11 ASSESSMENT:  82yo male with PMHx of RAFFIfib on Eliquis, CHF, HTN, CKD, SAMMI, HLD, COPD, DM s/p IPP removal and reimplant seen as Trauma Alert s/p fall. +ht, -loc. Patient reports he has been dizzy for the last few days and tonight he was in the bathroom felt dizzy and fell, hitting his head on a toilet paper rubalcava. He is c/o of some headache and right elbow tenderness.   external signs of trauma include skin tear over the right elbow.  Trauma assessment in ED: ABCs intact , GCS 15 , AAOx3,  CACERES.     Injuries identified:   - none    PLAN:   no further trauma work up needed. patient cleared from trauma prospective  patient will benefit from syncope work up  trauma team to follow up for tertiary survey    Above plan discussed with Trauma attending, Dr. Leung , patient, patient family, and ED team  --------------------------------------------------------------------------------------  08-18-22 @ 23:11 ASSESSMENT:  80yo male with PMHx of A.fib on Eliquis, CHF, HTN, CKD, SAMMI, HLD, COPD, DM s/p IPP removal and reimplant seen as Trauma Alert s/p fall. +ht, -loc. Patient reports he has been dizzy for the last few days and tonight he was in the bathroom felt dizzy and fell, hitting his head on a toilet paper rubalcava. He is c/o of some headache and right elbow tenderness.   external signs of trauma include skin tear over the right elbow.  Trauma assessment in ED: ABCs intact , GCS 15 , AAOx3,  CACERES.     Injuries identified:   - none    PLAN:   no further trauma work up needed. patient cleared from trauma prospective  patient will benefit from syncope work up  trauma team to follow up for tertiary survey    Senior addendum seen and examined by me. no acute traumatic injuries. will perform tertiary trauma exam in 24 hrs    Above plan discussed with Trauma attending, Dr. Leung , patient, patient family, and ED team  --------------------------------------------------------------------------------------  08-18-22 @ 23:11

## 2022-08-18 NOTE — ED ADULT NURSE NOTE - NSIMPLEMENTINTERV_GEN_ALL_ED
Implemented All Fall with Harm Risk Interventions:  [place] to call system. Call bell, personal items and telephone within reach. Instruct patient to call for assistance. Room bathroom lighting operational. Non-slip footwear when patient is off stretcher. Physically safe environment: no spills, clutter or unnecessary equipment. Stretcher in lowest position, wheels locked, appropriate side rails in place. Provide visual cue, wrist band, yellow gown, etc. Monitor gait and stability. Monitor for mental status changes and reorient to person, place, and time. Review medications for side effects contributing to fall risk. Reinforce activity limits and safety measures with patient and family. Provide visual clues: red socks.

## 2022-08-18 NOTE — ED ADULT NURSE NOTE - OBJECTIVE STATEMENT
Pt came s/p fall in the bathroom, hit his head and left arm, denies LOC, pt is on Eliquis,  c/o headache and nausea. pt family states he has been seeming confused lately. pt denies any pain, sob, fever, cp.

## 2022-08-18 NOTE — ED ADULT TRIAGE NOTE - CHIEF COMPLAINT QUOTE
Pt came s/p fall in the bathroom, hit his head and left arm, denies LOC, pt is on Eliquis,  c/o headache and nausea. Trauma alert was activated in triage.

## 2022-08-19 LAB
ALBUMIN SERPL ELPH-MCNC: 3.8 G/DL — SIGNIFICANT CHANGE UP (ref 3.5–5.2)
ALP SERPL-CCNC: 93 U/L — SIGNIFICANT CHANGE UP (ref 30–115)
ALT FLD-CCNC: 12 U/L — SIGNIFICANT CHANGE UP (ref 0–41)
ANION GAP SERPL CALC-SCNC: 10 MMOL/L — SIGNIFICANT CHANGE UP (ref 7–14)
ANION GAP SERPL CALC-SCNC: 14 MMOL/L — SIGNIFICANT CHANGE UP (ref 7–14)
APPEARANCE UR: CLEAR — SIGNIFICANT CHANGE UP
APTT BLD: 31.3 SEC — SIGNIFICANT CHANGE UP (ref 27–39.2)
AST SERPL-CCNC: 22 U/L — SIGNIFICANT CHANGE UP (ref 0–41)
BACTERIA # UR AUTO: NEGATIVE — SIGNIFICANT CHANGE UP
BASOPHILS # BLD AUTO: 0.03 K/UL — SIGNIFICANT CHANGE UP (ref 0–0.2)
BASOPHILS NFR BLD AUTO: 0.5 % — SIGNIFICANT CHANGE UP (ref 0–1)
BILIRUB SERPL-MCNC: 0.2 MG/DL — SIGNIFICANT CHANGE UP (ref 0.2–1.2)
BILIRUB UR-MCNC: NEGATIVE — SIGNIFICANT CHANGE UP
BUN SERPL-MCNC: 18 MG/DL — SIGNIFICANT CHANGE UP (ref 10–20)
BUN SERPL-MCNC: 18 MG/DL — SIGNIFICANT CHANGE UP (ref 10–20)
CALCIUM SERPL-MCNC: 9.2 MG/DL — SIGNIFICANT CHANGE UP (ref 8.5–10.1)
CALCIUM SERPL-MCNC: 9.4 MG/DL — SIGNIFICANT CHANGE UP (ref 8.5–10.1)
CHLORIDE SERPL-SCNC: 103 MMOL/L — SIGNIFICANT CHANGE UP (ref 98–110)
CHLORIDE SERPL-SCNC: 111 MMOL/L — HIGH (ref 98–110)
CK SERPL-CCNC: 113 U/L — SIGNIFICANT CHANGE UP (ref 0–225)
CO2 SERPL-SCNC: 25 MMOL/L — SIGNIFICANT CHANGE UP (ref 17–32)
CO2 SERPL-SCNC: 25 MMOL/L — SIGNIFICANT CHANGE UP (ref 17–32)
COLOR SPEC: SIGNIFICANT CHANGE UP
CREAT SERPL-MCNC: 1.4 MG/DL — SIGNIFICANT CHANGE UP (ref 0.7–1.5)
CREAT SERPL-MCNC: 1.7 MG/DL — HIGH (ref 0.7–1.5)
DIFF PNL FLD: NEGATIVE — SIGNIFICANT CHANGE UP
EGFR: 40 ML/MIN/1.73M2 — LOW
EGFR: 50 ML/MIN/1.73M2 — LOW
EOSINOPHIL # BLD AUTO: 0.49 K/UL — SIGNIFICANT CHANGE UP (ref 0–0.7)
EOSINOPHIL NFR BLD AUTO: 8 % — SIGNIFICANT CHANGE UP (ref 0–8)
EPI CELLS # UR: 1 /HPF — SIGNIFICANT CHANGE UP (ref 0–5)
ETHANOL SERPL-MCNC: <10 MG/DL — SIGNIFICANT CHANGE UP
GLUCOSE BLDC GLUCOMTR-MCNC: 144 MG/DL — HIGH (ref 70–99)
GLUCOSE BLDC GLUCOMTR-MCNC: 145 MG/DL — HIGH (ref 70–99)
GLUCOSE BLDC GLUCOMTR-MCNC: 171 MG/DL — HIGH (ref 70–99)
GLUCOSE BLDC GLUCOMTR-MCNC: 83 MG/DL — SIGNIFICANT CHANGE UP (ref 70–99)
GLUCOSE BLDC GLUCOMTR-MCNC: 95 MG/DL — SIGNIFICANT CHANGE UP (ref 70–99)
GLUCOSE SERPL-MCNC: 109 MG/DL — HIGH (ref 70–99)
GLUCOSE SERPL-MCNC: 140 MG/DL — HIGH (ref 70–99)
GLUCOSE UR QL: ABNORMAL
HCT VFR BLD CALC: 43.2 % — SIGNIFICANT CHANGE UP (ref 42–52)
HGB BLD-MCNC: 13.6 G/DL — LOW (ref 14–18)
HYALINE CASTS # UR AUTO: 0 /LPF — SIGNIFICANT CHANGE UP (ref 0–7)
IMM GRANULOCYTES NFR BLD AUTO: 0.2 % — SIGNIFICANT CHANGE UP (ref 0.1–0.3)
INR BLD: 1.14 RATIO — SIGNIFICANT CHANGE UP (ref 0.65–1.3)
KETONES UR-MCNC: NEGATIVE — SIGNIFICANT CHANGE UP
LACTATE SERPL-SCNC: 0.9 MMOL/L — SIGNIFICANT CHANGE UP (ref 0.7–2)
LEUKOCYTE ESTERASE UR-ACNC: NEGATIVE — SIGNIFICANT CHANGE UP
LIDOCAIN IGE QN: 10 U/L — SIGNIFICANT CHANGE UP (ref 7–60)
LYMPHOCYTES # BLD AUTO: 1.44 K/UL — SIGNIFICANT CHANGE UP (ref 1.2–3.4)
LYMPHOCYTES # BLD AUTO: 23.4 % — SIGNIFICANT CHANGE UP (ref 20.5–51.1)
MAGNESIUM SERPL-MCNC: 2.2 MG/DL — SIGNIFICANT CHANGE UP (ref 1.8–2.4)
MCHC RBC-ENTMCNC: 28.9 PG — SIGNIFICANT CHANGE UP (ref 27–31)
MCHC RBC-ENTMCNC: 31.5 G/DL — LOW (ref 32–37)
MCV RBC AUTO: 91.7 FL — SIGNIFICANT CHANGE UP (ref 80–94)
MONOCYTES # BLD AUTO: 0.6 K/UL — SIGNIFICANT CHANGE UP (ref 0.1–0.6)
MONOCYTES NFR BLD AUTO: 9.7 % — HIGH (ref 1.7–9.3)
NEUTROPHILS # BLD AUTO: 3.59 K/UL — SIGNIFICANT CHANGE UP (ref 1.4–6.5)
NEUTROPHILS NFR BLD AUTO: 58.2 % — SIGNIFICANT CHANGE UP (ref 42.2–75.2)
NITRITE UR-MCNC: NEGATIVE — SIGNIFICANT CHANGE UP
NRBC # BLD: 0 /100 WBCS — SIGNIFICANT CHANGE UP (ref 0–0)
PH UR: 8 — SIGNIFICANT CHANGE UP (ref 5–8)
PHOSPHATE SERPL-MCNC: 3 MG/DL — SIGNIFICANT CHANGE UP (ref 2.1–4.9)
PLATELET # BLD AUTO: 244 K/UL — SIGNIFICANT CHANGE UP (ref 130–400)
POTASSIUM SERPL-MCNC: 4 MMOL/L — SIGNIFICANT CHANGE UP (ref 3.5–5)
POTASSIUM SERPL-MCNC: 4.6 MMOL/L — SIGNIFICANT CHANGE UP (ref 3.5–5)
POTASSIUM SERPL-SCNC: 4 MMOL/L — SIGNIFICANT CHANGE UP (ref 3.5–5)
POTASSIUM SERPL-SCNC: 4.6 MMOL/L — SIGNIFICANT CHANGE UP (ref 3.5–5)
PROT SERPL-MCNC: 6.5 G/DL — SIGNIFICANT CHANGE UP (ref 6–8)
PROT UR-MCNC: ABNORMAL
PROTHROM AB SERPL-ACNC: 13.1 SEC — HIGH (ref 9.95–12.87)
RBC # BLD: 4.71 M/UL — SIGNIFICANT CHANGE UP (ref 4.7–6.1)
RBC # FLD: 18.1 % — HIGH (ref 11.5–14.5)
RBC CASTS # UR COMP ASSIST: 1 /HPF — SIGNIFICANT CHANGE UP (ref 0–4)
SARS-COV-2 RNA SPEC QL NAA+PROBE: SIGNIFICANT CHANGE UP
SODIUM SERPL-SCNC: 138 MMOL/L — SIGNIFICANT CHANGE UP (ref 135–146)
SODIUM SERPL-SCNC: 150 MMOL/L — HIGH (ref 135–146)
SP GR SPEC: 1.03 — HIGH (ref 1.01–1.03)
TROPONIN T SERPL-MCNC: <0.01 NG/ML — SIGNIFICANT CHANGE UP
UROBILINOGEN FLD QL: SIGNIFICANT CHANGE UP
WBC # BLD: 6.16 K/UL — SIGNIFICANT CHANGE UP (ref 4.8–10.8)
WBC # FLD AUTO: 6.16 K/UL — SIGNIFICANT CHANGE UP (ref 4.8–10.8)
WBC UR QL: 1 /HPF — SIGNIFICANT CHANGE UP (ref 0–5)

## 2022-08-19 PROCEDURE — 72125 CT NECK SPINE W/O DYE: CPT | Mod: 26,MA

## 2022-08-19 PROCEDURE — 99221 1ST HOSP IP/OBS SF/LOW 40: CPT

## 2022-08-19 PROCEDURE — 74177 CT ABD & PELVIS W/CONTRAST: CPT | Mod: 26,MA

## 2022-08-19 PROCEDURE — 70450 CT HEAD/BRAIN W/O DYE: CPT | Mod: 26,MA

## 2022-08-19 PROCEDURE — 71260 CT THORAX DX C+: CPT | Mod: 26,MA

## 2022-08-19 PROCEDURE — 93010 ELECTROCARDIOGRAM REPORT: CPT

## 2022-08-19 PROCEDURE — 99283 EMERGENCY DEPT VISIT LOW MDM: CPT

## 2022-08-19 PROCEDURE — 99222 1ST HOSP IP/OBS MODERATE 55: CPT

## 2022-08-19 RX ORDER — APIXABAN 2.5 MG/1
1 TABLET, FILM COATED ORAL
Qty: 0 | Refills: 0 | DISCHARGE

## 2022-08-19 RX ORDER — INSULIN GLARGINE 100 [IU]/ML
15 INJECTION, SOLUTION SUBCUTANEOUS AT BEDTIME
Refills: 0 | Status: DISCONTINUED | OUTPATIENT
Start: 2022-08-19 | End: 2022-08-21

## 2022-08-19 RX ORDER — INSULIN LISPRO 100/ML
5 VIAL (ML) SUBCUTANEOUS
Refills: 0 | Status: DISCONTINUED | OUTPATIENT
Start: 2022-08-19 | End: 2022-08-21

## 2022-08-19 RX ORDER — ATORVASTATIN CALCIUM 80 MG/1
40 TABLET, FILM COATED ORAL AT BEDTIME
Refills: 0 | Status: DISCONTINUED | OUTPATIENT
Start: 2022-08-19 | End: 2022-08-19

## 2022-08-19 RX ORDER — DEXTROSE 50 % IN WATER 50 %
25 SYRINGE (ML) INTRAVENOUS ONCE
Refills: 0 | Status: DISCONTINUED | OUTPATIENT
Start: 2022-08-19 | End: 2022-08-21

## 2022-08-19 RX ORDER — MECLIZINE HCL 12.5 MG
25 TABLET ORAL EVERY 8 HOURS
Refills: 0 | Status: DISCONTINUED | OUTPATIENT
Start: 2022-08-19 | End: 2022-08-21

## 2022-08-19 RX ORDER — BUMETANIDE 0.25 MG/ML
1 INJECTION INTRAMUSCULAR; INTRAVENOUS DAILY
Refills: 0 | Status: DISCONTINUED | OUTPATIENT
Start: 2022-08-19 | End: 2022-08-19

## 2022-08-19 RX ORDER — DILTIAZEM HCL 120 MG
120 CAPSULE, EXT RELEASE 24 HR ORAL DAILY
Refills: 0 | Status: DISCONTINUED | OUTPATIENT
Start: 2022-08-19 | End: 2022-08-19

## 2022-08-19 RX ORDER — DILTIAZEM HCL 120 MG
120 CAPSULE, EXT RELEASE 24 HR ORAL DAILY
Refills: 0 | Status: DISCONTINUED | OUTPATIENT
Start: 2022-08-19 | End: 2022-08-20

## 2022-08-19 RX ORDER — INSULIN LISPRO 100/ML
VIAL (ML) SUBCUTANEOUS
Refills: 0 | Status: DISCONTINUED | OUTPATIENT
Start: 2022-08-19 | End: 2022-08-21

## 2022-08-19 RX ORDER — AMIODARONE HYDROCHLORIDE 400 MG/1
200 TABLET ORAL DAILY
Refills: 0 | Status: DISCONTINUED | OUTPATIENT
Start: 2022-08-19 | End: 2022-08-19

## 2022-08-19 RX ORDER — ALBUTEROL 90 UG/1
2.5 AEROSOL, METERED ORAL
Refills: 0 | Status: DISCONTINUED | OUTPATIENT
Start: 2022-08-19 | End: 2022-08-21

## 2022-08-19 RX ORDER — GLUCAGON INJECTION, SOLUTION 0.5 MG/.1ML
1 INJECTION, SOLUTION SUBCUTANEOUS ONCE
Refills: 0 | Status: DISCONTINUED | OUTPATIENT
Start: 2022-08-19 | End: 2022-08-21

## 2022-08-19 RX ORDER — ATORVASTATIN CALCIUM 80 MG/1
40 TABLET, FILM COATED ORAL AT BEDTIME
Refills: 0 | Status: DISCONTINUED | OUTPATIENT
Start: 2022-08-19 | End: 2022-08-21

## 2022-08-19 RX ORDER — SACUBITRIL AND VALSARTAN 24; 26 MG/1; MG/1
1 TABLET, FILM COATED ORAL
Refills: 0 | Status: DISCONTINUED | OUTPATIENT
Start: 2022-08-19 | End: 2022-08-21

## 2022-08-19 RX ORDER — APIXABAN 2.5 MG/1
2.5 TABLET, FILM COATED ORAL EVERY 12 HOURS
Refills: 0 | Status: DISCONTINUED | OUTPATIENT
Start: 2022-08-19 | End: 2022-08-21

## 2022-08-19 RX ORDER — SODIUM CHLORIDE 9 MG/ML
1000 INJECTION, SOLUTION INTRAVENOUS
Refills: 0 | Status: DISCONTINUED | OUTPATIENT
Start: 2022-08-19 | End: 2022-08-19

## 2022-08-19 RX ORDER — SODIUM CHLORIDE 9 MG/ML
1000 INJECTION, SOLUTION INTRAVENOUS
Refills: 0 | Status: DISCONTINUED | OUTPATIENT
Start: 2022-08-19 | End: 2022-08-21

## 2022-08-19 RX ORDER — PANTOPRAZOLE SODIUM 20 MG/1
40 TABLET, DELAYED RELEASE ORAL
Refills: 0 | Status: DISCONTINUED | OUTPATIENT
Start: 2022-08-19 | End: 2022-08-19

## 2022-08-19 RX ORDER — MECLIZINE HCL 12.5 MG
25 TABLET ORAL ONCE
Refills: 0 | Status: COMPLETED | OUTPATIENT
Start: 2022-08-19 | End: 2022-08-19

## 2022-08-19 RX ORDER — INSULIN LISPRO 100/ML
5 VIAL (ML) SUBCUTANEOUS
Refills: 0 | Status: DISCONTINUED | OUTPATIENT
Start: 2022-08-20 | End: 2022-08-21

## 2022-08-19 RX ORDER — TAMSULOSIN HYDROCHLORIDE 0.4 MG/1
0.4 CAPSULE ORAL AT BEDTIME
Refills: 0 | Status: DISCONTINUED | OUTPATIENT
Start: 2022-08-19 | End: 2022-08-21

## 2022-08-19 RX ORDER — AMIODARONE HYDROCHLORIDE 400 MG/1
200 TABLET ORAL DAILY
Refills: 0 | Status: DISCONTINUED | OUTPATIENT
Start: 2022-08-19 | End: 2022-08-21

## 2022-08-19 RX ORDER — SACUBITRIL AND VALSARTAN 24; 26 MG/1; MG/1
1 TABLET, FILM COATED ORAL
Refills: 0 | Status: DISCONTINUED | OUTPATIENT
Start: 2022-08-19 | End: 2022-08-19

## 2022-08-19 RX ORDER — DEXTROSE 50 % IN WATER 50 %
15 SYRINGE (ML) INTRAVENOUS ONCE
Refills: 0 | Status: DISCONTINUED | OUTPATIENT
Start: 2022-08-19 | End: 2022-08-21

## 2022-08-19 RX ORDER — PANTOPRAZOLE SODIUM 20 MG/1
40 TABLET, DELAYED RELEASE ORAL
Refills: 0 | Status: DISCONTINUED | OUTPATIENT
Start: 2022-08-19 | End: 2022-08-21

## 2022-08-19 RX ADMIN — APIXABAN 2.5 MILLIGRAM(S): 2.5 TABLET, FILM COATED ORAL at 09:33

## 2022-08-19 RX ADMIN — PANTOPRAZOLE SODIUM 40 MILLIGRAM(S): 20 TABLET, DELAYED RELEASE ORAL at 09:33

## 2022-08-19 RX ADMIN — APIXABAN 2.5 MILLIGRAM(S): 2.5 TABLET, FILM COATED ORAL at 17:01

## 2022-08-19 RX ADMIN — TAMSULOSIN HYDROCHLORIDE 0.4 MILLIGRAM(S): 0.4 CAPSULE ORAL at 22:01

## 2022-08-19 RX ADMIN — Medication 25 MILLIGRAM(S): at 04:46

## 2022-08-19 RX ADMIN — SACUBITRIL AND VALSARTAN 1 TABLET(S): 24; 26 TABLET, FILM COATED ORAL at 12:07

## 2022-08-19 RX ADMIN — SODIUM CHLORIDE 80 MILLILITER(S): 9 INJECTION, SOLUTION INTRAVENOUS at 09:47

## 2022-08-19 RX ADMIN — ATORVASTATIN CALCIUM 40 MILLIGRAM(S): 80 TABLET, FILM COATED ORAL at 22:01

## 2022-08-19 RX ADMIN — Medication 5 UNIT(S): at 16:56

## 2022-08-19 RX ADMIN — SACUBITRIL AND VALSARTAN 1 TABLET(S): 24; 26 TABLET, FILM COATED ORAL at 17:01

## 2022-08-19 RX ADMIN — Medication 25 MILLIGRAM(S): at 22:01

## 2022-08-19 RX ADMIN — AMIODARONE HYDROCHLORIDE 200 MILLIGRAM(S): 400 TABLET ORAL at 09:33

## 2022-08-19 RX ADMIN — Medication 25 MILLIGRAM(S): at 12:09

## 2022-08-19 RX ADMIN — Medication 5 UNIT(S): at 12:07

## 2022-08-19 NOTE — ED PROVIDER NOTE - NS ED ROS FT
Constitutional:  No fevers or chills.  Eyes:  No visual changes, eye pain, or discharge.  ENT:  No hearing changes. No sore throat.  Neck:  No neck pain or stiffness.  Cardiac:  No CP or edema.  Resp:  No cough or SOB. No hemoptysis.   GI:  No nausea, vomiting, diarrhea, or abdominal pain.  :  No urinary frequency.  MSK:  No myalgias or joint pain/swelling.  Neuro:  (+) headache, dizziness. No weakness.  Skin:  Skin tear on right arm

## 2022-08-19 NOTE — ED PROVIDER NOTE - ATTENDING CONTRIBUTION TO CARE
I personally evaluated patient. I agree with the findings and plan with all documentation on chart except as documented  in my note.    81-year-old male past medical history of hypertension, A. fib on Eliquis, CHF, CKD, SAMMI, dyslipidemia, COPD, diabetes presents to the emergency department status post fall with head trauma.  Patient called a trauma alert in triage based on head trauma on Eliquis.  Patient reports that he has been dizzy for the past few days and tonight when he was in the bathroom he felt dizzy and fell and hit his head.  Patient developed a skin tear to his arm from the toilet paper rubalcava. patient reports also having a headache.  Patient reports not feeling right and being dizzy since a recent penile pump change.  Patient reports having an indwelling Thompson catheter and was instructed by his doctor to remove this by himself.  This was complicated by bleeding as patient did not fully deflate the balloon.  Patient continues to not feel right after this and then had a fall today.    Patient brought to the critical care emergency department.  Primary survey intact.  GCS 15.  Secondary survey shows mild skin tear to right upper arm.  Patient has no spinal tenderness, rib tenderness, no abdominal tenderness.  Will perform appropriate trauma work-up and reassess.  Will follow up recommendations from trauma service.

## 2022-08-19 NOTE — CONSULT NOTE ADULT - SUBJECTIVE AND OBJECTIVE BOX
NEUROLOGY CONSULT    HPI:  Patient is an 82yo male with PMH of A.fib, CHF, HTN, CKD, SAMMI, HLD, COPD, DM s/p IPP who present to the emergency room yesterday with 2 days of new onset dizziness which caused him to fall and hit his head last night. For the last 2 days patient has noticed he is more dizzy, feeling like the world is spinning, especially when shifting positions. Patient is s/p ICD placement many years ago and denies feeling his ICD shock him. Last night he was laying down in bed watching TV when he noticed he began to get dizzy. He attempted to stand up and fell down slightly, but was able to get back up. He walked to the bathroom and when he went to sit down, he immedately got extremely dizzy, fell and scraped his arm against the toilet paper rubalcava and hit his head on the wall which prompted him to come to the emergency department.     Patient denies LOC, bowel or urine incontinence, palpitations before or after the fall, and SOB. Patient does state that he has some mild hearing loss in the R ear, but no ringing.     In the ED vital signs:  HR: 77  BP: 178/86   O2: 92% RA (On home O2)  Afebrile    ED work up consisted of CT scan of the head, chest, abdomen, and pelvis which showed no acute pathology in any area of the body. Patient had an EKG which showed possible 1st-degree heart block vs PAC. UA was negative. Troponin was negative.    Patient is admitted for syncope work up  (19 Aug 2022 07:58)     MEDICATIONS  Home Medications:  albuterol 5 mg/mL (0.5%) inhalation solution: 0.5 milliliter(s) inhaled 2 times a day (19 Aug 2022 09:12)  amiodarone 200 mg oral tablet: 1 tab(s) orally once a day (19 Aug 2022 09:12)  atorvastatin 40 mg oral tablet: 1 tab(s) orally once a day (at bedtime) (19 Aug 2022 09:12)  bumetanide 1 mg oral tablet: 1 tab(s) orally once a day (19 Aug 2022 09:12)  dilTIAZem 120 mg oral tablet: 1 tab(s) orally once a day (19 Aug 2022 09:12)  Eliquis 2.5 mg oral tablet: 1 tab(s) orally 2 times a day (19 Aug 2022 09:12)  Entresto 24 mg-26 mg oral tablet: 1 tab(s) orally once a day (19 Aug 2022 09:12)  glimepiride 2 mg oral tablet: 1 tab(s) orally once a day (19 Aug 2022 09:12)  Jardiance 25 mg oral tablet: 1 tab(s) orally once a day (in the morning) (19 Aug 2022 09:12)  pantoprazole 40 mg oral delayed release tablet: 1 tab(s) orally once a day (before a meal) (19 Aug 2022 09:12)  Symbicort 160 mcg-4.5 mcg/inh inhalation aerosol: 2 puff(s) inhaled 2 times a day (19 Aug 2022 09:12)  tamsulosin 0.4 mg oral capsule: 1 cap(s) orally once a day (at bedtime) (19 Aug 2022 09:12)    MEDICATIONS  (STANDING):  aMIOdarone    Tablet 200 milliGRAM(s) Oral daily  apixaban 2.5 milliGRAM(s) Oral every 12 hours  atorvastatin 40 milliGRAM(s) Oral at bedtime  dextrose 5%. 1000 milliLiter(s) (50 mL/Hr) IV Continuous <Continuous>  dextrose 50% Injectable 25 Gram(s) IV Push once  diltiazem    Tablet 120 milliGRAM(s) Oral daily  glucagon  Injectable 1 milliGRAM(s) IntraMuscular once  insulin glargine Injectable (LANTUS) 15 Unit(s) SubCutaneous at bedtime  insulin lispro (ADMELOG) corrective regimen sliding scale   SubCutaneous three times a day before meals  insulin lispro Injectable (ADMELOG) 5 Unit(s) SubCutaneous before lunch  insulin lispro Injectable (ADMELOG) 5 Unit(s) SubCutaneous before dinner  lactated ringers. 1000 milliLiter(s) (80 mL/Hr) IV Continuous <Continuous>  meclizine 25 milliGRAM(s) Oral every 8 hours  pantoprazole    Tablet 40 milliGRAM(s) Oral before breakfast  sacubitril 24 mG/valsartan 26 mG 1 Tablet(s) Oral two times a day  tamsulosin 0.4 milliGRAM(s) Oral at bedtime    MEDICATIONS  (PRN):  ALBUTerol   0.5% 2.5 milliGRAM(s) Nebulizer two times a day PRN Shortness of Breath and/or Wheezing  dextrose Oral Gel 15 Gram(s) Oral once PRN Blood Glucose LESS THAN 70 milliGRAM(s)/deciliter      FAMILY HISTORY:    SOCIAL HISTORY: negative for tobacco, alcohol, or ilicit drug use.    Allergies    No Known Allergies    Intolerances        GEN: NAD, pleasant, cooperative    NEURO:   MENTAL STATUS: AAOx3  LANG/SPEECH: Fluent, intact naming, repetition & comprehension  CRANIAL NERVES:  II: Pupils equal and reactive, no RAPD, normal visual field and fundus  III, IV, VI: EOM intact, no gaze preference or deviation  V: normal  VII: no facial asymmetry  VIII: normal hearing to speech  MOTOR: 5/5 in both upper and lower extremities  REFLEXES: 2/4 throughout, bilateral flexor plantars  SENSORY: Normal to touch, temperature & pin prick in all extremiteis  COORD: Normal finger to nose and heel to shin, no tremor, no dysmetria  Gait: not assessed       LABS:                        13.6   6.16  )-----------( 244      ( 18 Aug 2022 23:50 )             43.2     -    138  |  103  |  18  ----------------------------<  140<H>  4.0   |  25  |  1.4    Ca    9.2      19 Aug 2022 11:42  Phos  3.0     -  Mg     2.2         TPro  6.5  /  Alb  3.8  /  TBili  0.2  /  DBili  x   /  AST  22  /  ALT  12  /  AlkPhos  93  08-18    Hemoglobin A1C:   Vitamin B12   PT/INR - ( 18 Aug 2022 23:50 )   PT: 13.10 sec;   INR: 1.14 ratio         PTT - ( 18 Aug 2022 23:50 )  PTT:31.3 sec  CAPILLARY BLOOD GLUCOSE      POCT Blood Glucose.: 145 mg/dL (19 Aug 2022 11:18)      Urinalysis Basic - ( 19 Aug 2022 02:50 )    Color: Light Yellow / Appearance: Clear / S.031 / pH: x  Gluc: x / Ketone: Negative  / Bili: Negative / Urobili: <2 mg/dL   Blood: x / Protein: 30 mg/dL / Nitrite: Negative   Leuk Esterase: Negative / RBC: 1 /HPF / WBC 1 /HPF   Sq Epi: x / Non Sq Epi: 1 /HPF / Bacteria: Negative            Microbiology:      RADIOLOGY, EKG AND ADDITIONAL TESTS: Reviewed.           NEUROLOGY CONSULT    HPI:  Patient is an 80yo male with PMH of A.fib, CHF, HTN, CKD, SAMMI, HLD, COPD, DM s/p IPP who present to the emergency room yesterday with 2 days of new onset dizziness which caused him to fall and hit his head last night. For the last 2 days patient has noticed he is more dizzy, feeling like the world is spinning, especially when shifting positions. Patient is s/p ICD placement many years ago and denies feeling his ICD shock him. Last night he was laying down in bed watching TV when he noticed he began to get dizzy. He attempted to stand up and fell down slightly, but was able to get back up. He walked to the bathroom and when he went to sit down, he immedately got extremely dizzy, fell and scraped his arm against the toilet paper rubalcava and hit his head on the wall which prompted him to come to the emergency department.     Patient denies LOC, bowel or urine incontinence, palpitations before or after the fall, and SOB. Patient does state that he has some mild hearing loss in the R ear, but no ringing.     In the ED vital signs:  HR: 77  BP: 178/86   O2: 92% RA (On home O2)  Afebrile    ED work up consisted of CT scan of the head, chest, abdomen, and pelvis which showed no acute pathology in any area of the body. Patient had an EKG which showed possible 1st-degree heart block vs PAC. UA was negative. Troponin was negative.    Patient is admitted for syncope work up  (19 Aug 2022 07:58)     MEDICATIONS  Home Medications:  albuterol 5 mg/mL (0.5%) inhalation solution: 0.5 milliliter(s) inhaled 2 times a day (19 Aug 2022 09:12)  amiodarone 200 mg oral tablet: 1 tab(s) orally once a day (19 Aug 2022 09:12)  atorvastatin 40 mg oral tablet: 1 tab(s) orally once a day (at bedtime) (19 Aug 2022 09:12)  bumetanide 1 mg oral tablet: 1 tab(s) orally once a day (19 Aug 2022 09:12)  dilTIAZem 120 mg oral tablet: 1 tab(s) orally once a day (19 Aug 2022 09:12)  Eliquis 2.5 mg oral tablet: 1 tab(s) orally 2 times a day (19 Aug 2022 09:12)  Entresto 24 mg-26 mg oral tablet: 1 tab(s) orally once a day (19 Aug 2022 09:12)  glimepiride 2 mg oral tablet: 1 tab(s) orally once a day (19 Aug 2022 09:12)  Jardiance 25 mg oral tablet: 1 tab(s) orally once a day (in the morning) (19 Aug 2022 09:12)  pantoprazole 40 mg oral delayed release tablet: 1 tab(s) orally once a day (before a meal) (19 Aug 2022 09:12)  Symbicort 160 mcg-4.5 mcg/inh inhalation aerosol: 2 puff(s) inhaled 2 times a day (19 Aug 2022 09:12)  tamsulosin 0.4 mg oral capsule: 1 cap(s) orally once a day (at bedtime) (19 Aug 2022 09:12)    MEDICATIONS  (STANDING):  aMIOdarone    Tablet 200 milliGRAM(s) Oral daily  apixaban 2.5 milliGRAM(s) Oral every 12 hours  atorvastatin 40 milliGRAM(s) Oral at bedtime  dextrose 5%. 1000 milliLiter(s) (50 mL/Hr) IV Continuous <Continuous>  dextrose 50% Injectable 25 Gram(s) IV Push once  diltiazem    Tablet 120 milliGRAM(s) Oral daily  glucagon  Injectable 1 milliGRAM(s) IntraMuscular once  insulin glargine Injectable (LANTUS) 15 Unit(s) SubCutaneous at bedtime  insulin lispro (ADMELOG) corrective regimen sliding scale   SubCutaneous three times a day before meals  insulin lispro Injectable (ADMELOG) 5 Unit(s) SubCutaneous before lunch  insulin lispro Injectable (ADMELOG) 5 Unit(s) SubCutaneous before dinner  lactated ringers. 1000 milliLiter(s) (80 mL/Hr) IV Continuous <Continuous>  meclizine 25 milliGRAM(s) Oral every 8 hours  pantoprazole    Tablet 40 milliGRAM(s) Oral before breakfast  sacubitril 24 mG/valsartan 26 mG 1 Tablet(s) Oral two times a day  tamsulosin 0.4 milliGRAM(s) Oral at bedtime    MEDICATIONS  (PRN):  ALBUTerol   0.5% 2.5 milliGRAM(s) Nebulizer two times a day PRN Shortness of Breath and/or Wheezing  dextrose Oral Gel 15 Gram(s) Oral once PRN Blood Glucose LESS THAN 70 milliGRAM(s)/deciliter      FAMILY HISTORY:    SOCIAL HISTORY: negative for tobacco, alcohol, or ilicit drug use.    Allergies    No Known Allergies    Intolerances        GEN: NAD, pleasant, cooperative    NEURO:   MENTAL STATUS: AAOx3  LANG/SPEECH: Fluent, intact naming, repetition & comprehension  CRANIAL NERVES:  II: Pupils equal and reactive, no RAPD, normal visual field and fundus  III, IV, VI: Saccadic eye movement on right gaze, EOM intact, no gaze preference or deviation  V: normal  VII: no facial asymmetry  VIII: normal hearing to speech  MOTOR: 5/5 in both upper and lower extremities  REFLEXES: 2/4 throughout, bilateral flexor plantars  SENSORY: Normal to touch, temperature & pin prick in all extremiteis  COORD: Normal finger to nose and heel to shin, no tremor, no dysmetria  Gait: not assessed       LABS:                        13.6   6.16  )-----------( 244      ( 18 Aug 2022 23:50 )             43.2     08-    138  |  103  |  18  ----------------------------<  140<H>  4.0   |  25  |  1.4    Ca    9.2      19 Aug 2022 11:42  Phos  3.0     -  Mg     2.2     -    TPro  6.5  /  Alb  3.8  /  TBili  0.2  /  DBili  x   /  AST  22  /  ALT  12  /  AlkPhos  93  08-18    Hemoglobin A1C:   Vitamin B12   PT/INR - ( 18 Aug 2022 23:50 )   PT: 13.10 sec;   INR: 1.14 ratio         PTT - ( 18 Aug 2022 23:50 )  PTT:31.3 sec  CAPILLARY BLOOD GLUCOSE      POCT Blood Glucose.: 145 mg/dL (19 Aug 2022 11:18)      Urinalysis Basic - ( 19 Aug 2022 02:50 )    Color: Light Yellow / Appearance: Clear / S.031 / pH: x  Gluc: x / Ketone: Negative  / Bili: Negative / Urobili: <2 mg/dL   Blood: x / Protein: 30 mg/dL / Nitrite: Negative   Leuk Esterase: Negative / RBC: 1 /HPF / WBC 1 /HPF   Sq Epi: x / Non Sq Epi: 1 /HPF / Bacteria: Negative            Microbiology:      RADIOLOGY, EKG AND ADDITIONAL TESTS: Reviewed.

## 2022-08-19 NOTE — H&P ADULT - NSHPLABSRESULTS_GEN_ALL_CORE
Complete Blood Count + Automated Diff (08.18.22 @ 23:50)    WBC Count: 6.16 K/uL    RBC Count: 4.71 M/uL    Hemoglobin: 13.6 g/dL    Hematocrit: 43.2 %    Mean Cell Volume: 91.7 fL    Mean Cell Hemoglobin: 28.9 pg    Mean Cell Hemoglobin Conc: 31.5 g/dL    Red Cell Distrib Width: 18.1 %    Platelet Count - Automated: 244 K/uL    Auto Neutrophil #: 3.59 K/uL    Auto Lymphocyte #: 1.44 K/uL    Auto Monocyte #: 0.60 K/uL    Auto Eosinophil #: 0.49 K/uL    Auto Basophil #: 0.03 K/uL    Auto Neutrophil %: 58.2: Differential percentages must be correlated with absolute numbers for  clinical significance. %    Auto Lymphocyte %: 23.4 %    Auto Monocyte %: 9.7 %    Auto Eosinophil %: 8.0 %    Auto Basophil %: 0.5 %    Auto Immature Granulocyte %: 0.2: (Includes meta, myelo and promyelocytes) %    Nucleated RBC: 0 /100 WBCs    Comprehensive Metabolic Panel (08.18.22 @ 23:50)    Sodium, Serum: 150 mmol/L    Potassium, Serum: 4.6: Slighty Hemolyzed use with Caution mmol/L    Chloride, Serum: 111 mmol/L    Carbon Dioxide, Serum: 25 mmol/L    Anion Gap, Serum: 14 mmol/L    Blood Urea Nitrogen, Serum: 18 mg/dL    Creatinine, Serum: 1.7 mg/dL    Glucose, Serum: 109 mg/dL    Calcium, Total Serum: 9.4 mg/dL    Protein Total, Serum: 6.5 g/dL    Albumin, Serum: 3.8 g/dL    Bilirubin Total, Serum: 0.2 mg/dL    Alkaline Phosphatase, Serum: 93 U/L    Aspartate Aminotransferase (AST/SGOT): 22: Hemolyzed. Interpret with caution U/L    Alanine Aminotransferase (ALT/SGPT): 12 U/L    eGFR: 40: The estimated glomerular filtration rate (eGFR) is calculated using the  2021 CKD-EPI creatinine equation, which does not have a coefficient for  race and is validated in individuals 18 years of age and older (N Engl J  Med 2021; 385:5204-1514). Creatinine-based eGFR may be inaccurate in  various situations including but not limited to extremes of muscle mass,  altered dietary protein intake, or medications that affect renal tubular  creatinine secretion. mL/min/1.73m2      < from: CT Abdomen and Pelvis w/ IV Cont (08.19.22 @ 01:14) >    IMPRESSION:    No CT evidence of acute intrathoracic, intra-abdominal, or pelvic   pathology.    Cystic replacement in the pancreatic body appears possibly on the basis   of main duct dilatation measuring up to 1.1 cm, new from prior.   Outpatient contrast-enhanced pancreatic MRI/MRCP is recommended for   further evaluation as neoplasm is not excluded.    Mild thickening of the right apical/upper lobe subpleural reticulation   and linear opacification in the setting of emphysema. While findings   likely reflect apical fibrosis/scarring, recommend 3 month follow-up   chest CT for reevaluate given no priors for comparison.    1.3 cm left thyroid gland nodule. Can consider outpatient thyroid   ultrasound for further evaluation.    --- End of Report ---      < end of copied text >

## 2022-08-19 NOTE — ED PROVIDER NOTE - PHYSICAL EXAMINATION
PHYSICAL EXAM: I have reviewed current vital signs.  GENERAL: NAD, well-nourished; well-developed.  HEAD:  Normocephalic, atraumatic.  EYES: EOMI, PERRL, conjunctiva and sclera clear.  ENT: MMM, no erythema/exudates.  NECK: Supple, no JVD.  CHEST/LUNG: Clear to auscultation bilaterally; no wheezes, rales, or rhonchi.  HEART: Regular rate and rhythm, normal S1 and S2; no murmurs, rubs, or gallops.  ABDOMEN: Soft, nontender, nondistended.  EXTREMITIES:  7x2cm abrasion on posterior aspect of right upper arm. 2+ peripheral pulses; no clubbing, cyanosis, or edema.   PSYCH: Cooperative, appropriate, normal mood and affect.  NEUROLOGY: A&O x 3. Motor 5/5. Sensory intact. No focal neurological deficits. CN II - XII intact. (-) dysmetria, facial droop, pronator drift.  SKIN: Warm and dry. PHYSICAL EXAM: I have reviewed current vital signs.  GENERAL: NAD, well-nourished; well-developed.  HEAD:  Normocephalic, atraumatic.  EYES: EOMI, PERRL, conjunctiva and sclera clear.  ENT: + b/l external auditory canals with moderate hard cerumen nonobstructing, TMs clear b/l; MMM, no erythema/exudates.  NECK: Supple, no JVD.  CHEST/LUNG: Clear to auscultation bilaterally; no wheezes, rales, or rhonchi.  HEART: Regular rate and rhythm, normal S1 and S2; no murmurs, rubs, or gallops.  ABDOMEN: Soft, nontender, nondistended.  EXTREMITIES:  7x2cm abrasion on posterior aspect of right upper arm. 2+ peripheral pulses; no clubbing, cyanosis, or edema.   PSYCH: Cooperative, appropriate, normal mood and affect.  NEUROLOGY: A&O x 3. Motor 5/5. Sensory intact. No focal neurological deficits. CN II - XII intact. (-) dysmetria, facial droop, pronator drift.  SKIN: Warm and dry.

## 2022-08-19 NOTE — PATIENT PROFILE ADULT - FALL HARM RISK - HARM RISK INTERVENTIONS
Assistance with ambulation/Assistance OOB with selected safe patient handling equipment/Communicate Risk of Fall with Harm to all staff/Discuss with provider need for PT consult/Monitor gait and stability/Orthostatic vital signs/Provide patient with walking aids - walker, cane, crutches/Reinforce activity limits and safety measures with patient and family/Sit up slowly, dangle for a short time, stand at bedside before walking/Tailored Fall Risk Interventions/Visual Cue: Yellow wristband and red socks/Bed in lowest position, wheels locked, appropriate side rails in place/Call bell, personal items and telephone in reach/Instruct patient to call for assistance before getting out of bed or chair/Non-slip footwear when patient is out of bed/Colon to call system/Physically safe environment - no spills, clutter or unnecessary equipment/Purposeful Proactive Rounding/Room/bathroom lighting operational, light cord in reach

## 2022-08-19 NOTE — H&P ADULT - ASSESSMENT
Patient is an 80yo male with PMH of A.fib, CHF, HTN, CKD, SAMMI, HLD, COPD, DM s/p IPP who present to the emergency room yesterday with 2 days of new onset dizziness worse with movement, being managed for syncope most likely due to vertigo.     #Syncope  #Vertigo vs TIA vs arrythmia  -Patient neuo exam show no focal deficits  -Denies fecal or urine incontinence  -Patient s/p ICD placement, denies feeling shock  -EKG shows possible 1st degree heart block  -Patient denies palpitations follows closely with cardiologist Dr. Holguin   -Shira Hallpike maneuver positive when patients head was turned to the right side  -CT head negative    Plan:  -TTE ordered  - Patient is an 80yo male with PMH of A.fib, CHF, HTN, CKD, SAMMI, HLD, COPD, DM s/p IPP who present to the emergency room yesterday with 2 days of new onset dizziness worse with movement, being managed for syncope most likely due to vertigo.     #Syncope  #Vertigo vs TIA vs arrythmia  -Patient neuo exam show no focal deficits  -Denies fecal or urine incontinence  -Patient s/p ICD placement, denies feeling shock  -EKG shows possible 1st degree heart block  -Patient denies palpitations follows closely with cardiologist Dr. Holguin   -Shira Hallpike maneuver positive when patients head was turned to the right side  -CT head negative    Plan:  -TTE ordered  -Cardiology consult appreciated   -Monitor neurologic status  -Orthostatics  -Gentle IV rehydration  -PT/OT    #Hypernatremia  #Hyperchloremia  -Na 150   -Cl 111  -Labs elevated on admission  -Patient endorses eating and drinking fine    Plan:  -Repeat BMP at 11  -Gently rehydration of LR @80ml/hr    #AFib  #CHF  #HTN  -Patient slightly hypertensive on admission  -Baseline EKG sinus  -Fluid status euvolemic (no SOB, crackles on auscultation no lower extremity edema)    Plan:  -c/w Bumex 1mg in the AM   -c/w Cardizem 120mg at noon  -c/w amiodarone 200mg in the am  -c/w Entresto 24mg-26mg BID  -c/w Eliquis 2.5mg BID     #Diabetes  -Sugar well controlled <120 since presentation  -Home jardiance and glimepride    Plan:  -POCT glucose with meals and at bed time   -Lantus 30U at bedtime  -Lispro 5U 15 minutes prior to meals  -Sliding scale for additional coverage    #COPD  -Controlled  -Home O2 2L  -Slightly hypoxic on admission at room air (92%)  -Normalized on 2L (96)    Plan:  -2L NC   -c/w albuterol nebulizer prn        Patient is an 82yo male with PMH of A.fib, CHF, HTN, CKD, SAMMI, HLD, COPD, DM s/p IPP who present to the emergency room yesterday with 2 days of new onset dizziness worse with movement, being managed for syncope most likely due to vertigo.     #Syncope  #Vertigo vs TIA vs arrythmia  -Patient neuo exam show no focal deficits  -Denies fecal or urine incontinence  -Patient s/p ICD placement, denies feeling shock  -EKG shows possible 1st degree heart block  -Patient denies palpitations follows closely with cardiologist Dr. Holguin   -Shira Hallpike maneuver positive when patients head was turned to the right side  -CT head negative    Plan:  -TTE ordered  -Cardiology consult appreciated   -Monitor neurologic status  -Orthostatics  -Gentle IV rehydration  -PT/OT    #Hypernatremia  #Hyperchloremia  -Na 150   -Cl 111  -Labs elevated on admission  -Patient endorses eating and drinking fine    Plan:  -Repeat BMP at 11  -Gently rehydration of LR @80ml/hr    #AFib  #CHF  #HTN  -Patient slightly hypertensive on admission  -Baseline EKG sinus  -Fluid status euvolemic (no SOB, crackles on auscultation no lower extremity edema)    Plan:  -c/w Bumex 1mg in the AM   -c/w Cardizem 120mg at noon  -c/w amiodarone 200mg in the am  -c/w Entresto 24mg-26mg BID  -c/w Eliquis 2.5mg BID     #Diabetes  -Sugar well controlled <120 since presentation  -Home jardiance and glimepride    Plan:  -POCT glucose with meals and at bed time   -Lantus 30U at bedtime  -Lispro 5U 15 minutes prior to meals  -Sliding scale for additional coverage    #COPD  -Controlled  -Home O2 2L  -Slightly hypoxic on admission at room air (92%)  -Normalized on 2L (96)    Plan:  -2L NC   -c/w albuterol nebulizer prn       #Misc  - DVT Prophylaxis: Eliquis 2.5mg BID  - GI Prophylaxis: Protonix 40mg   - Diet: DASH/Carb consistent  - Activity: As tolerated   - IV Fluids: LR 80ml/hr  - Code Status:    Dispo: Home when stable    Patient is an 80yo male with PMH of A.fib, CHF, HTN, CKD, SAMMI, HLD, COPD, DM s/p IPP who present to the emergency room yesterday with 2 days of new onset dizziness worse with movement, being managed for syncope most likely due to vertigo.     #Pre-syncope  #Vertigo vs TIA vs arrythmia  -Patient neuo exam show no focal deficits  -Denies fecal or urine incontinence  -Patient s/p ICD placement, denies feeling shock  -EKG shows possible 1st degree heart block  -Patient denies palpitations follows closely with cardiologist Dr. Holguin   -Shira Hallpike maneuver positive when patients head was turned to the right side  -CT head negative    Plan:  -TTE ordered  -Cardiology consult appreciated   -Monitor neurologic status  -Orthostatics  -Gentle IV rehydration  -PT/OT    #Hypernatremia  #Hyperchloremia  -Na 150   -Cl 111  -Labs elevated on admission  -Patient endorses eating and drinking fine    Plan:  -Repeat BMP at 11  -Gently rehydration of LR @80ml/hr    #AFib  #CHF  #HTN  -Patient slightly hypertensive on admission  -Baseline EKG sinus  -Fluid status euvolemic (no SOB, crackles on auscultation no lower extremity edema)    Plan:  -c/w Bumex 1mg in the AM   -c/w Cardizem 120mg at noon  -c/w amiodarone 200mg in the am  -c/w Entresto 24mg-26mg BID  -c/w Eliquis 2.5mg BID     #Diabetes  -Sugar well controlled <120 since presentation  -Home jardiance and glimepride    Plan:  -POCT glucose with meals and at bed time   -Lantus 30U at bedtime  -Lispro 5U 15 minutes prior to meals  -Sliding scale for additional coverage    #COPD  -Controlled  -Home O2 2L  -Slightly hypoxic on admission at room air (92%)  -Normalized on 2L (96)    Plan:  -2L NC   -c/w albuterol nebulizer prn       #Misc  - DVT Prophylaxis: Eliquis 2.5mg BID  - GI Prophylaxis: Protonix 40mg   - Diet: DASH/Carb consistent  - Activity: As tolerated   - IV Fluids: LR 80ml/hr  - Code Status:    Dispo: Home when stable

## 2022-08-19 NOTE — CONSULT NOTE ADULT - ASSESSMENT
ENT called to evaluate Pt. for dizziness x 2 day started last Wednesday, w repet episode on Thursday s/p fall 6 steps, No HT, No LOC  ENT called to evaluate Pt. for vertigo likely peripheral.  Pt. seen and examined at bedside in NAD, currently on Meclizine.  Pt. reports 2 days ago developed for the first time dizziness with sensation of room spinning around, that lasted for 3 minutes, with recurrent of episodes associated with nausea.   Pt. denies recent viral infections although states he has frequent bronchitis 2/2 COPD for which he is frequently treated with Abx. Pt. reports intermittent tinnitus to Right ear associated with taking shower and water gets to the ear, which improves by cleaning ear with  Qtips.   Pt. denies fever, chills, decreased hearing. F/U with Dr. Rod as an outpatient.     A: Vertigo     Plan:   Cont. Meclizine if symptoms improving, only for symptomatic tx, needs to be off meclozine prior outpatient ENT eval   OP F/U for Vestibular rehab  F/U Neurology evaluation, MRI ordered  Outpatient Audiogram and VNG  Cont. current medical management as per primary medical team   F/U with Pt's own ENT Dr. Rod   Will d/w ENT attending

## 2022-08-19 NOTE — CONSULT NOTE ADULT - ASSESSMENT
· Assessment	  Patient is an 80yo male with PMH of A.fib, CHF, HTN, CKD, SAMMI, HLD, COPD, DM s/p IPP who present to the emergency room yesterday with 2 days of new onset dizziness and sensation of room spinning around him, first started when he was lying in the room. No nausea/vomiting/ringing sensation in the ear/palpitations/sob.    #Likely vertigo - Stable   Hypernatremia, Na 150 on RL 50cc/hr  - BP elevated, HR wnl  - ECG - 1st degree heart block vs PVC no bradycardia  - ECHO pending s/p ICD placement  - CTH no acute intracranial pathology  - CT cervical spine - Degenerative changes with severe right foraminal narrowing at C3-C4 and central canal stenosis, C5-C6 bilateral foraminal narrowing  - Pending Orthostatics    Plan   - continue with meclizine 25mg q8  - ENT consult, vestibular rehab      This note is incomplete · Assessment	  Patient is an 80yo male with PMH of A.fib, CHF, HTN, CKD, SAMMI, HLD, COPD, DM s/p IPP who present to the emergency room yesterday with 2 days of new onset dizziness and sensation of room spinning around him, first started when he was lying in the room. No nausea/vomiting/ringing sensation in the ear/palpitations/sob.    #Likely vertigo - Stable   Hypernatremia, Na 150 on RL 50cc/hr  - BP elevated, HR wnl  - ECG - 1st degree heart block vs PVC no bradycardia  - ECHO pending s/p ICD placement  - CTH no acute intracranial pathology  - CT cervical spine - Degenerative changes with severe right foraminal narrowing at C3-C4 and central canal stenosis, C5-C6 bilateral foraminal narrowing  - Pending Orthostatics    Plan   - continue with meclizine 25mg q8  - ENT consult, vestibular rehab  - MRI brain without contrast      This note is incomplete · Assessment	  Patient is an 80yo male with PMH of A.fib, CHF, HTN, CKD, SAMMI, HLD, COPD, DM s/p IPP who present to the emergency room yesterday with 2 days of new onset dizziness and sensation of room spinning around him, first started when he was lying in the room. No nausea/vomiting/ringing sensation in the ear/weakness/facial palsy/palpitations/sob.    #Likely vertigo - Stable   Hypernatremia, Na 150 on RL 50cc/hr  - BP elevated, HR wnl  - No focal weakness  - ECG - 1st degree heart block vs PVC no bradycardia  - ECHO pending s/p ICD placement  - CTH no acute intracranial pathology  - CT cervical spine - Degenerative changes with severe right foraminal narrowing at C3-C4 and central canal stenosis, C5-C6 bilateral foraminal narrowing  - Pending Orthostatics    Plan   - continue with meclizine 25mg q8  - outpatient ENT consult, vestibular rehab  - MRI brain without contrast  - Occupational therapy consult       This note is incomplete · Assessment	  Patient is an 82yo male with PMH of A.fib, CHF, HTN, CKD, SAMMI, HLD, COPD, DM s/p IPP who present to the emergency room yesterday with 2 days of new onset dizziness and sensation of room spinning around him, first started when he was lying in the room. No nausea/vomiting/ringing sensation in the ear/weakness/facial palsy/palpitations/sob.    #Likely vertigo - Stable   Hypernatremia, Na 150 on RL 50cc/hr  - BP elevated, HR wnl  - No focal weakness  - ECG - 1st degree heart block vs PVC no bradycardia  - ECHO pending s/p ICD placement  - CTH no acute intracranial pathology  - CT cervical spine - Degenerative changes with severe right foraminal narrowing at C3-C4 and central canal stenosis, C5-C6 bilateral foraminal narrowing  - Pending Orthostatics    Plan   - continue with meclizine 25mg q8  - outpatient ENT consult, vestibular rehab  - MRI brain without contrast to r/o stroke  - Occupational therapy consult     · Assessment	  Patient is an 80yo male with PMH of A.fib, CHF, HTN, CKD, SAMMI, HLD, COPD, DM s/p IPP who present to the emergency room yesterday with 2 days of new onset dizziness and sensation of room spinning around him, first started when he was lying in the room. No nausea/vomiting/ringing sensation in the ear/weakness/facial palsy/palpitations/sob.    #Likely vertigo - Stable   Hypernatremia, Na 150 on RL 50cc/hr  - BP elevated, HR wnl  - No focal weakness  - ECG - 1st degree heart block vs PVC no bradycardia  - ECHO pending s/p ICD placement  - CTH no acute intracranial pathology  - CT cervical spine - Degenerative changes with severe right foraminal narrowing at C3-C4 and central canal stenosis, C5-C6 bilateral foraminal narrowing  - Pending Orthostatics    Plan   - continue with meclizine 25mg q8  - outpatient ENT consult, vestibular rehab  - MRI brain without contrast to r/o stroke  - Occupational therapy consult for vestibular rehab

## 2022-08-19 NOTE — CONSULT NOTE ADULT - ATTENDING COMMENTS
Trauma Attending H&P Attestation    Patient seen and evaluated with the trauma team in the trauma bay upon arrival. All pertinent labs and radiographic imaging reviewed, pending final reports. Outpatient medications reviewed, including the presence of anticoagulants, if applicable. I agree with the resident's note above, including the physical exam findings, assessment and plan as documented with the following adjustments.     Trauma Level: [ ] Code  [x ] Alert  [ ] Consult [ ] Transfer in  Activation by:  [ x] ED physician [x ] EMS  Intubated in Field? [ ] Yes [ x] No  Intubated in ED? [ ] Yes [x ] No  Intubated in Trauma [place]? [ ] Yes [x ] No    FARIHA KHOURY Patient is a 81y old  Male who presents with a chief complaint of Dizziness and fall in the shower      Patient presented with GCS [15 ]  upon arrival to the trauma bay.  Allergies  No Known Allergies  Intolerances    On AC/Antiplatelets [ x] Yes [ ] No              [x ] NOVACs, [ ] Coumadin, [ ] ASA, [ ] Antiplatelets     Vital Signs Last 24 Hrs  T(C): 36.4 (19 Aug 2022 07:30), Max: 36.4 (19 Aug 2022 05:55)  T(F): 97.6 (19 Aug 2022 07:30), Max: 97.6 (19 Aug 2022 07:30)  HR: 78 (19 Aug 2022 07:30) (74 - 107)  BP: 137/74 (19 Aug 2022 11:59) (117/73 - 178/86)  BP(mean): 79 (19 Aug 2022 11:59) (79 - 90)  RR: 18 (19 Aug 2022 07:30) (18 - 19)  SpO2: 99% (19 Aug 2022 07:30) (92% - 99%)    Parameters below as of 19 Aug 2022 07:30  Patient On (Oxygen Delivery Method): nasal cannula  O2 Flow (L/min): 2    Assessment: syncopal fall on AC    PLAN  - supportive care  - GI/DVT prophylaxis  - pain management  - repeat studies as needed  - complete and follow up on trauma work up included but not limites to                          [x ] CXR [x ] PXR [x ] Extremities X-RAYs                          [x ] NCHCT [x ] C-Spine CT [x ] CT Chest [x ] CT Abdomen/Pelvis                          [x ] FAST [ ] Other                          [ x] Trauma Labs    [ ] Toxicology   - Follow up Consults  [ ] Neurosurgery [ ] Orthopaedics [ ] Plastics [ ] Fascial/OMFS [ ] Opthalmology [x ] Medicine [ ] Cardiology/EP                                        [ ] SICU/SDU [ ] Urology  - IV ABx give as indicated [ ] Yes [x ] No  - Tetanus given as indicated [ ] Yes [ x] No    Laurel Leung MD, FACS  Trauma/ACS/Surgical Critical Care Attending
I have personally seen and examined this patient.  I have fully participated in the care of this patient.  I have reviewed all pertinent clinical information, including history, physical exam, plan and note.  The episode description is more suggestive of transient vertigo rather than TIA. Exam showed evidence of abnormal left vestibular function on saccades. Given the risk factor recommend Brain MRI to r/o CVA. Continue meclizine and OT for vestibular rehab. No further neuro work up if Brain MRI shows no acute infarct   I have reviewed all pertinent clinical information and reviewed all relevant imaging and diagnostic studies personally.  Recommendations as above.  Agree with above assessment except as noted.

## 2022-08-19 NOTE — ED PROVIDER NOTE - OBJECTIVE STATEMENT
82 yo M with PMH of HTN, A-Fib on Eliquis, CHF, CKD, SAMMI, dyslipidemia, COPD, diabetes presents to the ED status post fall with head trauma. Patient reports experiencing episodes of dizziness for the last three days and tonight while he was in the bathroom, he fell over from standing because he felt dizzy. Patient hit his right arm on the toilet paper rubalcava and suffered a skin tear. Patient denies fever, chills, chest pain, shortness of breath, abdominal pain, nausea, vomiting, and diarrhea.

## 2022-08-19 NOTE — H&P ADULT - NSHPPHYSICALEXAM_GEN_ALL_CORE
PHYSICAL EXAM:  GENERAL: NAD, well-groomed, well-developed  HEAD:  Atraumatic, Normocephalic  EYES: EOMI, PERRLA, conjunctiva and sclera clear  HEART: Regular rate and rhythm; No murmurs  RESPIRATORY: CTA B/L, No W/R/R  ABDOMEN: Soft, Nontender, Nondistended; Bowel sounds present  NEUROLOGY: A&Ox3, nonfocal, moving all extremities  EXTREMITIES:  2+ Peripheral Pulses, No clubbing, cyanosis, or edema  SKIN: warm, normal color, no rash or abnormal lesions, patient has right upper extremity wrapped    [place] Hallpike maneuver positive for increased dizziness when patient was turned to the left side.

## 2022-08-19 NOTE — H&P ADULT - ATTENDING COMMENTS
--My note supersedes all residents notes that I sign, My correction for their notes are in my notes --    82yo male with PMH of A.fib, CHF, HTN, CKD, SAMMI, HLD, COPD, DM, R shoulder arthoplasty ,  s/p IPP who present to the emergency room yesterday with 2 days of new onset dizziness which caused him to fall and hit his head last night.     the patient stated that he fell twice (yesterday and the day before) after he was trying to get out of bed , he stated that he fell to the floor but did not hit his head , he denies any cardiac device , he stated that he had stents in the heart but no device ( xray also with no AICD)   On exam General awake, alert NAD, Lungs clear to ausculations b/l, Heart regular ryhthm, Abdomen: soft, non tender non distended, Ext no edema   labs/imaging and EKG reviewed   a/p:   []S/P FALLS twice , Dizziness r/o syncope vs seizure vs BPPV   []First degree heart block   [] Acute Kidney Injury on Chronic kidney disease 3  []Hypernatremia and hyperchloremia   []Pancreatic cystic lesion   []Abnormal CT lung  liklky apical fibrosis/scarring  []1.3 cm left thyroid gland nodule    - check orthostatis    -Echo   MRI brain ( if R shoulder arthoplasty and IPP compatible with MRI)   Neuroconsult   neurocheck   cardiac  monitor   Hold bumex ( labs looks dehdrated with vy, hypernatrmia , high urine sp gravity)   EP consult for possible symptomatic heart block   Check TSH   need outpaient GI and MRCP   Need outpatient pulmonary and repeat CT chest   need outpatient endocrine and thyroid US   agree with ENT consult   PT/OT tian --My note supersedes all residents notes that I sign, My correction for their notes are in my notes --    82yo male with PMH of A.fib, CHF, HTN, CKD, SAMMI, HLD, COPD, DM, R shoulder arthoplasty ,  s/p IPP who present to the emergency room yesterday with 2 days of new onset dizziness which caused him to fall and hit his head last night.     the patient stated that he fell twice (yesterday and the day before) after he was trying to get out of bed , he stated that he fell to the floor but did not hit his head , he denies any cardiac device , he stated that he had stents in the heart but no device ( xray also with no AICD)   On exam General awake, alert NAD, Lungs clear to ausculations b/l, Heart regular ryhthm, Abdomen: soft, non tender non distended, Ext no edema   labs/imaging and EKG reviewed   a/p:   []S/P FALLS twice , Dizziness r/o syncope vs seizure vs BPPV   []First degree heart block   [] Acute Kidney Injury on Chronic kidney disease 3  []Hypernatremia and hyperchloremia   []Pancreatic cystic lesion   []Abnormal CT lung  liklky apical fibrosis/scarring  []1.3 cm left thyroid gland nodule  []Hx of A fib, Congestive Heart Failure , Coronary  Heart Disease /STENT     - check orthostatis    -Echo   MRI brain ( if R shoulder arthoplasty and IPP compatible with MRI)   Neuroconsult   neurocheck   cardiac  monitor   Hold bumex ( labs looks dehdrated with vy, hypernatrmia , high urine sp gravity)   EP consult for possible symptomatic heart block   Check TSH   need outpatient GI and MRCP   Need outpatient pulmonary and repeat CT chest   need outpatient endocrine and thyroid US   agree with ENT consult   resume home meds for afib/htn/chf/cad but hold bumex   PT/OT eval

## 2022-08-19 NOTE — ED PROVIDER NOTE - CLINICAL SUMMARY MEDICAL DECISION MAKING FREE TEXT BOX
Patient evaluated s/p fall with head trauma on Eliquis.  Evaluated by trauma and without acute traumatic injuries on panscan.  Patient symptoms improving in ED however when going to reassess any change of motion or movement in bed severe dizziness recurred and patient unable to stand up or change position despite meds.  Patient admitted for further work-up of dizziness

## 2022-08-19 NOTE — CONSULT NOTE ADULT - SUBJECTIVE AND OBJECTIVE BOX
Pt is a 82yo  M with PMH of A.fib, CHF, HTN, CKD, SAMMI, HLD, COPD, BPH on Tamsulosin, DM s/p IPP who present to the emergency room yesterday with 2 days of new onset dizziness which caused him to fall and hit his head last night. For the last 2 days patient has noticed he is more dizzy, feeling like the world is spinning, especially when shifting positions. Patient is s/p ICD placement many years ago and denies feeling his ICD shock him. Last night he was laying down in bed watching TV when he noticed he began to get dizzy. He attempted to stand up and fell down slightly, but was able to get back up. He walked to the bathroom and when he went to sit down, he immedately got extremely dizzy, fell and scraped his arm against the toilet paper rubalcava and hit his head on the wall which prompted him to come to the emergency department.   Patient denies LOC, bowel or urine incontinence, palpitations before or after the fall, and SOB. Patient does state that he has some mild hearing loss in the R ear, but no ringing.   ED work up consisted of CT scan of the head, chest, abdomen, and pelvis which showed no acute pathology in any area of the body. Patient had an EKG which showed possible 1st-degree heart block vs PAC. UA was negative. Troponin was negative.  Patient is admitted for syncope work up.      ENT called to evaluate Pt. for dizziness x 2 day started last Wednesday, w repet episode on Thursday s/p fall 6 steps, No HT, No LOC  ENT called to evaluate Pt. for vertigo likely peripheral.  Pt. seen and examined at bedside in [facility], currently on Meclizine.  Pt. reports 2 days ago developed for the first time dizziness with sensation of room spinning around, that lasted for 3 minutes, with recurrent of episodes associated with nausea.   Pt. denies recent viral infections although states he has frequent bronchitis 2/2 COPD for which he is frequently treated with Abx. Pt. reports intermittent tinnitus to Right ear associated with taking shower and water gets to the ear, which improves by cleaning ear with  Qtips.   Pt. denies fever, chills, decreased hearing. F/U with Dr. Rod as an outpatient.     PAST MEDICAL & SURGICAL HISTORY:  HTN (hypertension)  HLD (hyperlipidemia)  SAMMI (obstructive sleep apnea)  CAD (coronary artery disease) coronary stent x 1  DM (diabetes mellitus), type 2  COPD (chronic obstructive pulmonary disease)  Asthma  Atrial fibrillation  CHF (congestive heart failure)  History of 2019 novel coronavirus disease (COVID-19) 2020  CKD (chronic kidney disease)  History of penile implant  H/O right shoulder surgery  History of appendectomy  History of hernia repair abdominal  History of corneal transplant  S/P coronary angioplasty  History of percutaneous angioplasty    MEDICATIONS  (STANDING):  aMIOdarone    Tablet 200 milliGRAM(s) Oral daily  apixaban 2.5 milliGRAM(s) Oral every 12 hours  atorvastatin 40 milliGRAM(s) Oral at bedtime  dextrose 5%. 1000 milliLiter(s) (50 mL/Hr) IV Continuous <Continuous>  dextrose 50% Injectable 25 Gram(s) IV Push once  diltiazem    Tablet 120 milliGRAM(s) Oral daily  glucagon  Injectable 1 milliGRAM(s) IntraMuscular once  insulin glargine Injectable (LANTUS) 15 Unit(s) SubCutaneous at bedtime  insulin lispro (ADMELOG) corrective regimen sliding scale   SubCutaneous three times a day before meals  insulin lispro Injectable (ADMELOG) 5 Unit(s) SubCutaneous before lunch  insulin lispro Injectable (ADMELOG) 5 Unit(s) SubCutaneous before dinner  meclizine 25 milliGRAM(s) Oral every 8 hours  pantoprazole    Tablet 40 milliGRAM(s) Oral before breakfast  sacubitril 24 mG/valsartan 26 mG 1 Tablet(s) Oral two times a day  tamsulosin 0.4 milliGRAM(s) Oral at bedtime    MEDICATIONS  (PRN):  ALBUTerol   0.5% 2.5 milliGRAM(s) Nebulizer two times a day PRN Shortness of Breath and/or Wheezing  dextrose Oral Gel 15 Gram(s) Oral once PRN Blood Glucose LESS THAN 70 milliGRAM(s)/deciliter      Allergies; NKDA       SOCIAL HISTORY: Ex- smoker x 50 years, quit 25 years ago. Occasional ETOH, No hx of illicite drugs use.    FAMILY HISTORY:  No pertinent family hx     REVIEW OF SYSTEMS   [x] A ten-point review of systems was otherwise negative except as noted.     Vital Signs Last 24 Hrs  T(C): 36.2 (19 Aug 2022 13:55), Max: 36.4 (19 Aug 2022 05:55)  T(F): 97.2 (19 Aug 2022 13:55), Max: 97.6 (19 Aug 2022 07:30)  HR: 81 (19 Aug 2022 13:55) (74 - 107)  BP: 136/76 (19 Aug 2022 13:55) (117/73 - 178/86)  BP(mean): 98 (19 Aug 2022 13:55) (79 - 98)  RR: 18 (19 Aug 2022 13:55) (18 - 19)  SpO2: 99% (19 Aug 2022 07:30) (92% - 99%)    Parameters below as of 19 Aug 2022 13:55  Patient On (Oxygen Delivery Method): nasal cannula        GEN: NAD, awake and alert. No drooling or pooling of secretions. No stridor or stertor. Good vocal quality, no hoarseness.   SKIN: Good color, non diaphoretic.  HEENT: NC/AT; Oral mucosa prink and moist. No erythema or edema noted to buccal mucosa, tongue, FOM, uvula or posterior oropharynx. Uvula midline. B/L ears no ttp b/l, EAC C/D no drainage B/L, TM intact no bulging b/L.   Eyes: + nystagmus on [place]ira Halpike  to the right noted, Epley maneuvers performed    NECK: Trachea midline, Neck supple, no TTP to B/L lateral neck, no cervical LAD.  RESP: mild dyspnea on exertion, non-labored breathing. No use of accessory muscles.   CARDIO: +S1/S2  ABDO: Obese, Soft, NT.  EXT: CACERES x 4     LABS:                        13.6   6.16  )-----------( 244      ( 18 Aug 2022 23:50 )             43.2     08-19    138  |  103  |  18  ----------------------------<  140<H>  4.0   |  25  |  1.4    Ca    9.2      19 Aug 2022 11:42  Phos  3.0     08-19  Mg     2.2     08-19    TPro  6.5  /  Alb  3.8  /  TBili  0.2  /  DBili  x   /  AST  22  /  ALT  12  /  AlkPhos  93  08-18    PT/INR - ( 18 Aug 2022 23:50 )   PT: 13.10 sec;   INR: 1.14 ratio         PTT - ( 18 Aug 2022 23:50 )  PTT:31.3 sec   Urinalysis (08.19.22 @ 02:50)    Glucose Qualitative, Urine: >= 1000 mg/dL    Blood, Urine: Negative    pH Urine: 8.0    Color: Light Yellow    Urine Appearance: Clear    Bilirubin: Negative    Ketone - Urine: Negative    Specific Gravity: 1.031    Protein, Urine: 30 mg/dL    Urobilinogen: <2 mg/dL    Nitrite: Negative    Leukocyte Esterase Concentration: Negative      RADIOLOGY & ADDITIONAL STUDIES:  < from: CT Head No Cont (08.19.22 @ 00:40) >    ACC: [id number] EXAM:  CT BRAIN                          PROCEDURE DATE:  08/19/2022          INTERPRETATION:  Clinical History / Reason for exam: Trauma. Headache.    TECHNIQUE: CT of the head was performed without administration of   intravenous contrast. Multiple reformats were submitted for review.    COMPARISON:None.    FINDINGS:    Foci of hypodensity within the bihemispheric white matter most consistent   with mild microvascular ischemic disease.    Parenchymal volume is appropriate for patient age. No hydrocephalus.    No acute territorial infarct, intracranial hemorrhage, extra-axial fluid   collection, or midline shift.    Status post bilateral cataract surgery. Visualized intraorbital contents,   soft tissues, and osseous structures evidence no acute abnormality.    There are scattered areas of mucosal thickening, both smooth and   polypoid. Mastoid complexes are well aerated.    IMPRESSION:    No acute intracranial pathology or calvarial fracture.    --- End of Report ---          OLEG SOUZA MD; Resident Radiologist  This document has been electronically signed.  SAMSON MCCRARY MD; Attending Radiologist  This document has been electronically signed. Aug 19 2022  2:52AM    < end of copied text >

## 2022-08-19 NOTE — H&P ADULT - HISTORY OF PRESENT ILLNESS
Patient is an 82yo male with PMH of A.fib, CHF, HTN, CKD, SAMMI, HLD, COPD, DM s/p IPP who present to the emergency room yesterday with 2 days of new onset dizziness which caused him to fall and hit his head last night. For the last 2 days patient has noticed he is more dizzy especially when shifting to the right side. Patient is s/p ICD placement many years ago and denies feeling his ICD shock him. Last night he was laying down in bed watching TV when he noticed he began to get dizzy. He attempted to stand up and fell down slightly, but was able to get back up. He walked to the bathroom and when he went to sit down, he immedately got extremely dizzy, fell and scraped his arm against the toilet paper rubalcava and hit his head on the wall which prompted him to come to the emergency department.  Patient is an 82yo male with PMH of A.fib, CHF, HTN, CKD, SAMMI, HLD, COPD, DM s/p IPP who present to the emergency room yesterday with 2 days of new onset dizziness which caused him to fall and hit his head last night. For the last 2 days patient has noticed he is more dizzy, feeling like the world is spinning, especially when shifting positions. Patient is s/p ICD placement many years ago and denies feeling his ICD shock him. Last night he was laying down in bed watching TV when he noticed he began to get dizzy. He attempted to stand up and fell down slightly, but was able to get back up. He walked to the bathroom and when he went to sit down, he immedately got extremely dizzy, fell and scraped his arm against the toilet paper rubalcava and hit his head on the wall which prompted him to come to the emergency department.     Patient denies LOC, bowel or urine incontinence, palpitations before or after the fall, and SOB. Patient does state that he has some mild hearing loss in the R ear, but no ringing.     In the ED vital signs:  HR: 77  BP: 178/86   O2: 92% RA (On home O2)  Afebrile    ED work up consisted of CT scan of the head, chest, abdomen, and pelvis which showed no acute pathology in any area of the body. Patient had an EKG which showed possible 1st-degree heart block vs PAC. UA was negative. Troponin was negative.    Patient is admitted for syncope work up

## 2022-08-19 NOTE — ED PROVIDER NOTE - PROGRESS NOTE DETAILS
TD: Patient and his wife at bedside informed of all results including labs, UA, CT head, c-spine, chest, abd/pelvis negative for acute traumatic injury and incidental findings of thyroid nodule and pancreatic duct dilation vs. neoplasm. Pt now complaining of renewed vertigo, appearing dizzy and uncomfortable after being rolled to side in stretcher to change sheets. Discussed admission for vertigo with pt and wife at bedside who indicate understanding and agreement with plan; pt endorsed to MAR, covid swab obtained and sent to lab.

## 2022-08-20 LAB
ANION GAP SERPL CALC-SCNC: 9 MMOL/L — SIGNIFICANT CHANGE UP (ref 7–14)
BASOPHILS # BLD AUTO: 0.03 K/UL — SIGNIFICANT CHANGE UP (ref 0–0.2)
BASOPHILS NFR BLD AUTO: 0.4 % — SIGNIFICANT CHANGE UP (ref 0–1)
BUN SERPL-MCNC: 17 MG/DL — SIGNIFICANT CHANGE UP (ref 10–20)
CALCIUM SERPL-MCNC: 9.5 MG/DL — SIGNIFICANT CHANGE UP (ref 8.5–10.1)
CHLORIDE SERPL-SCNC: 107 MMOL/L — SIGNIFICANT CHANGE UP (ref 98–110)
CO2 SERPL-SCNC: 27 MMOL/L — SIGNIFICANT CHANGE UP (ref 17–32)
CREAT SERPL-MCNC: 1.4 MG/DL — SIGNIFICANT CHANGE UP (ref 0.7–1.5)
EGFR: 50 ML/MIN/1.73M2 — LOW
EOSINOPHIL # BLD AUTO: 0.63 K/UL — SIGNIFICANT CHANGE UP (ref 0–0.7)
EOSINOPHIL NFR BLD AUTO: 8.8 % — HIGH (ref 0–8)
GLUCOSE BLDC GLUCOMTR-MCNC: 112 MG/DL — HIGH (ref 70–99)
GLUCOSE BLDC GLUCOMTR-MCNC: 160 MG/DL — HIGH (ref 70–99)
GLUCOSE BLDC GLUCOMTR-MCNC: 216 MG/DL — HIGH (ref 70–99)
GLUCOSE BLDC GLUCOMTR-MCNC: 84 MG/DL — SIGNIFICANT CHANGE UP (ref 70–99)
GLUCOSE SERPL-MCNC: 88 MG/DL — SIGNIFICANT CHANGE UP (ref 70–99)
HCT VFR BLD CALC: 43.2 % — SIGNIFICANT CHANGE UP (ref 42–52)
HGB BLD-MCNC: 13.3 G/DL — LOW (ref 14–18)
IMM GRANULOCYTES NFR BLD AUTO: 0.3 % — SIGNIFICANT CHANGE UP (ref 0.1–0.3)
LYMPHOCYTES # BLD AUTO: 1.6 K/UL — SIGNIFICANT CHANGE UP (ref 1.2–3.4)
LYMPHOCYTES # BLD AUTO: 22.4 % — SIGNIFICANT CHANGE UP (ref 20.5–51.1)
MAGNESIUM SERPL-MCNC: 2.1 MG/DL — SIGNIFICANT CHANGE UP (ref 1.8–2.4)
MCHC RBC-ENTMCNC: 28.2 PG — SIGNIFICANT CHANGE UP (ref 27–31)
MCHC RBC-ENTMCNC: 30.8 G/DL — LOW (ref 32–37)
MCV RBC AUTO: 91.5 FL — SIGNIFICANT CHANGE UP (ref 80–94)
MONOCYTES # BLD AUTO: 0.68 K/UL — HIGH (ref 0.1–0.6)
MONOCYTES NFR BLD AUTO: 9.5 % — HIGH (ref 1.7–9.3)
NEUTROPHILS # BLD AUTO: 4.19 K/UL — SIGNIFICANT CHANGE UP (ref 1.4–6.5)
NEUTROPHILS NFR BLD AUTO: 58.6 % — SIGNIFICANT CHANGE UP (ref 42.2–75.2)
NRBC # BLD: 0 /100 WBCS — SIGNIFICANT CHANGE UP (ref 0–0)
PLATELET # BLD AUTO: 227 K/UL — SIGNIFICANT CHANGE UP (ref 130–400)
POTASSIUM SERPL-MCNC: 4.3 MMOL/L — SIGNIFICANT CHANGE UP (ref 3.5–5)
POTASSIUM SERPL-SCNC: 4.3 MMOL/L — SIGNIFICANT CHANGE UP (ref 3.5–5)
RBC # BLD: 4.72 M/UL — SIGNIFICANT CHANGE UP (ref 4.7–6.1)
RBC # FLD: 18 % — HIGH (ref 11.5–14.5)
SODIUM SERPL-SCNC: 143 MMOL/L — SIGNIFICANT CHANGE UP (ref 135–146)
WBC # BLD: 7.15 K/UL — SIGNIFICANT CHANGE UP (ref 4.8–10.8)
WBC # FLD AUTO: 7.15 K/UL — SIGNIFICANT CHANGE UP (ref 4.8–10.8)

## 2022-08-20 PROCEDURE — 99233 SBSQ HOSP IP/OBS HIGH 50: CPT

## 2022-08-20 PROCEDURE — 93306 TTE W/DOPPLER COMPLETE: CPT | Mod: 26

## 2022-08-20 RX ORDER — DILTIAZEM HCL 120 MG
120 CAPSULE, EXT RELEASE 24 HR ORAL DAILY
Refills: 0 | Status: DISCONTINUED | OUTPATIENT
Start: 2022-08-20 | End: 2022-08-21

## 2022-08-20 RX ADMIN — PANTOPRAZOLE SODIUM 40 MILLIGRAM(S): 20 TABLET, DELAYED RELEASE ORAL at 05:41

## 2022-08-20 RX ADMIN — SACUBITRIL AND VALSARTAN 1 TABLET(S): 24; 26 TABLET, FILM COATED ORAL at 05:41

## 2022-08-20 RX ADMIN — SACUBITRIL AND VALSARTAN 1 TABLET(S): 24; 26 TABLET, FILM COATED ORAL at 17:25

## 2022-08-20 RX ADMIN — Medication 2: at 11:52

## 2022-08-20 RX ADMIN — TAMSULOSIN HYDROCHLORIDE 0.4 MILLIGRAM(S): 0.4 CAPSULE ORAL at 21:32

## 2022-08-20 RX ADMIN — APIXABAN 2.5 MILLIGRAM(S): 2.5 TABLET, FILM COATED ORAL at 17:24

## 2022-08-20 RX ADMIN — Medication 120 MILLIGRAM(S): at 06:36

## 2022-08-20 RX ADMIN — Medication 5 UNIT(S): at 17:26

## 2022-08-20 RX ADMIN — AMIODARONE HYDROCHLORIDE 200 MILLIGRAM(S): 400 TABLET ORAL at 05:40

## 2022-08-20 RX ADMIN — APIXABAN 2.5 MILLIGRAM(S): 2.5 TABLET, FILM COATED ORAL at 05:40

## 2022-08-20 RX ADMIN — INSULIN GLARGINE 15 UNIT(S): 100 INJECTION, SOLUTION SUBCUTANEOUS at 21:32

## 2022-08-20 RX ADMIN — Medication 1: at 17:27

## 2022-08-20 RX ADMIN — Medication 5 UNIT(S): at 11:52

## 2022-08-20 RX ADMIN — Medication 25 MILLIGRAM(S): at 05:40

## 2022-08-20 RX ADMIN — Medication 25 MILLIGRAM(S): at 21:31

## 2022-08-20 RX ADMIN — Medication 25 MILLIGRAM(S): at 11:16

## 2022-08-20 RX ADMIN — ATORVASTATIN CALCIUM 40 MILLIGRAM(S): 80 TABLET, FILM COATED ORAL at 21:31

## 2022-08-20 NOTE — CONSULT NOTE ADULT - ASSESSMENT
Dizziness likely due to BP vertigo.   continue rx for same.   agree with meclizine.   vestibular therapy referral.     cad s/p stents, stable.   not on asa due to higher risk of bleeding     paf , in sinus now.   continue eliquis 2.5 mg bid. (age and creat based dose)    ckd - stable.     pvd s/p bypass. follows with dr. pate.       other issues.     dm  autumn  copd    Overall from cardiac perspective, no further investigations.   dc home   follow up out pt. non urgent. on next appt.

## 2022-08-20 NOTE — PROGRESS NOTE ADULT - ATTENDING COMMENTS
#Dizziness/ vertigo  lasted few minutes; awoke from sleep; no tinnitius, hearing loss  no syncope  cth neg  check mri  suspected bppv, plan for vestibular rehab  appreciate ent, neuro  PT  #Thyroid nodule  incidentally noted on ct  outpt us  #Lung thickening  incidentally noted on ct  outpt f/u with repeat ct in 3mos  #Pancreatic duct dilation  incidentally noted on ct  outpt f/u with mri/ mrcp      #Progress Note Handoff:  Pending (specify):  Consults_________, Tests________, Test Results_______, Other__mri brain_______  Family discussion: d/w pt at bedside re: treatment plan, primary dx  Disposition: Home___/SNF___/Other________/Unknown at this time____x____

## 2022-08-20 NOTE — CONSULT NOTE ADULT - SUBJECTIVE AND OBJECTIVE BOX
FARIHA KHOURY 81y (1940) Male    Daily     Daily     Patient is a 81y old  Male who presents with a chief complaint of Dizziness and fall (20 Aug 2022 12:36)      HPI:  Patient is an 80yo male with PMH of A.fib, CHF, HTN, CKD, SAMMI, HLD, COPD, DM s/p IPP who present to the emergency room yesterday with 2 days of new onset dizziness which caused him to fall and hit his head last night. For the last 2 days patient has noticed he is more dizzy, feeling like the world is spinning, especially when shifting positions. Patient is s/p ICD placement many years ago and denies feeling his ICD shock him. Last night he was laying down in bed watching TV when he noticed he began to get dizzy. He attempted to stand up and fell down slightly, but was able to get back up. He walked to the bathroom and when he went to sit down, he immedately got extremely dizzy, fell and scraped his arm against the toilet paper rubalcava and hit his head on the wall which prompted him to come to the emergency department.   Patient denies LOC, bowel or urine incontinence, palpitations before or after the fall, and SOB. Patient does state that he has some mild hearing loss in the R ear, but no ringing.     In the ED vital signs:  HR: 77  BP: 178/86   O2: 92% RA (On home O2)  Afebrile    ED work up consisted of CT scan of the head, chest, abdomen, and pelvis which showed no acute pathology in any area of the body. Patient had an EKG which showed possible 1st-degree heart block vs PAC. UA was negative. Troponin was negative.    Patient is admitted for syncope work up  (19 Aug 2022 07:58)    Cardiology consult   pt fell like he was tumbling down the hill. started few days ago when he was lying down and room was spinning. no nausea no vomiting. no syncope.   fell. hurt is right arm and hit his head as well.   after manipulation at edge of bed by physician, felt better. able to walk now without room spinning.   no cp  no palpitations.   has chronic lung issues. stable.     PAST MEDICAL & SURGICAL HISTORY:  HTN (hypertension)      HLD (hyperlipidemia)      SAMMI (obstructive sleep apnea)      CAD (coronary artery disease)  coronary stent x 1      DM (diabetes mellitus), type 2      COPD (chronic obstructive pulmonary disease)      Asthma      Atrial fibrillation      CHF (congestive heart failure)      History of 2019 novel coronavirus disease (COVID-19)  2020      CKD (chronic kidney disease)      History of penile implant      H/O shoulder surgery  right      History of appendectomy      History of hernia repair  abdominal      History of corneal transplant      S/P coronary angioplasty      History of percutaneous angioplasty          FAMILY HISTORY:      Home Medications:  albuterol 5 mg/mL (0.5%) inhalation solution: 0.5 milliliter(s) inhaled 2 times a day (19 Aug 2022 09:12)  amiodarone 200 mg oral tablet: 1 tab(s) orally once a day (19 Aug 2022 09:12)  atorvastatin 40 mg oral tablet: 1 tab(s) orally once a day (at bedtime) (19 Aug 2022 09:12)  bumetanide 1 mg oral tablet: 1 tab(s) orally once a day (19 Aug 2022 09:12)  dilTIAZem 120 mg oral tablet: 1 tab(s) orally once a day (19 Aug 2022 09:12)  Eliquis 2.5 mg oral tablet: 1 tab(s) orally 2 times a day (19 Aug 2022 09:12)  Entresto 24 mg-26 mg oral tablet: 1 tab(s) orally once a day (19 Aug 2022 09:12)  glimepiride 2 mg oral tablet: 1 tab(s) orally once a day (19 Aug 2022 09:12)  Jardiance 25 mg oral tablet: 1 tab(s) orally once a day (in the morning) (19 Aug 2022 09:12)  pantoprazole 40 mg oral delayed release tablet: 1 tab(s) orally once a day (before a meal) (19 Aug 2022 09:12)  Symbicort 160 mcg-4.5 mcg/inh inhalation aerosol: 2 puff(s) inhaled 2 times a day (19 Aug 2022 09:12)  tamsulosin 0.4 mg oral capsule: 1 cap(s) orally once a day (at bedtime) (19 Aug 2022 09:12)      REVIEW OF SYSTEMS:    CONSTITUTIONAL: No weakness, fevers or chills  EYES/ENT: No visual changes;  No vertigo or throat pain   NECK: No pain or stiffness  RESPIRATORY: see HPI  CARDIOVASCULAR: see HPI  GASTROINTESTINAL: No abdominal or epigastric pain. No nausea, vomiting, or hematemesis; No diarrhea or constipation. No melena or hematochezia.  GENITOURINARY: No dysuria, frequency or hematuria  NEUROLOGICAL: No numbness or weakness  SKIN: No itching, rashes    ON EXAM:  Vital Signs Last 24 Hrs  T(C): 36.7 (20 Aug 2022 13:32), Max: 36.7 (20 Aug 2022 13:32)  T(F): 98.1 (20 Aug 2022 13:32), Max: 98.1 (20 Aug 2022 13:32)  HR: 83 (20 Aug 2022 13:32) (83 - 85)  BP: 106/56 (20 Aug 2022 13:32) (106/56 - 125/72)  BP(mean): --  RR: 18 (20 Aug 2022 13:32) (18 - 18)  SpO2: --        GENERAL: NAD, oob to chair  SKIN: No rashes or lesions  HEAD:  Atraumatic, Normocephalic  EYES:  conjunctiva and sclera clear  ENMT: Moist mucous membranes  NECK: Supple, No JVD  CHEST/LUNG decreased air entry b/l.  HEART: S1, S2 appreciated. Rate and Rythm are regular.2/6 esm lsb aa.   ABDOMEN/GI: Soft, Nontender, Nondistended; Bowel sounds present  NERVOUS SYSTEM:  Alert & Oriented X3, Good concentration    LABS:    CARDIAC MARKERS ( 18 Aug 2022 23:50 )  x     / <0.01 ng/mL / 113 U/L / x     / x                                  13.3   7.15  )-----------( 227      ( 20 Aug 2022 06:52 )             43.2       CBC Full  -  ( 20 Aug 2022 06:52 )  WBC Count : 7.15 K/uL  RBC Count : 4.72 M/uL  Hemoglobin : 13.3 g/dL  Hematocrit : 43.2 %  Platelet Count - Automated : 227 K/uL  Mean Cell Volume : 91.5 fL  Mean Cell Hemoglobin : 28.2 pg  Mean Cell Hemoglobin Concentration : 30.8 g/dL  Auto Neutrophil # : 4.19 K/uL  Auto Lymphocyte # : 1.60 K/uL  Auto Monocyte # : 0.68 K/uL  Auto Eosinophil # : 0.63 K/uL  Auto Basophil # : 0.03 K/uL  Auto Neutrophil % : 58.6 %  Auto Lymphocyte % : 22.4 %  Auto Monocyte % : 9.5 %  Auto Eosinophil % : 8.8 %  Auto Basophil % : 0.4 %      08-20    143  |  107  |  17  ----------------------------<  88  4.3   |  27  |  1.4    Ca    9.5      20 Aug 2022 06:52  Phos  3.0     08-19  Mg     2.1     08-20    TPro  6.5  /  Alb  3.8  /  TBili  0.2  /  DBili  x   /  AST  22  /  ALT  12  /  AlkPhos  93  08-18      MEDICATIONS  (STANDING):  aMIOdarone    Tablet 200 milliGRAM(s) Oral daily  apixaban 2.5 milliGRAM(s) Oral every 12 hours  atorvastatin 40 milliGRAM(s) Oral at bedtime  dextrose 5%. 1000 milliLiter(s) (50 mL/Hr) IV Continuous <Continuous>  dextrose 50% Injectable 25 Gram(s) IV Push once  diltiazem    milliGRAM(s) Oral daily  glucagon  Injectable 1 milliGRAM(s) IntraMuscular once  insulin glargine Injectable (LANTUS) 15 Unit(s) SubCutaneous at bedtime  insulin lispro (ADMELOG) corrective regimen sliding scale   SubCutaneous three times a day before meals  insulin lispro Injectable (ADMELOG) 5 Unit(s) SubCutaneous before breakfast  insulin lispro Injectable (ADMELOG) 5 Unit(s) SubCutaneous before lunch  insulin lispro Injectable (ADMELOG) 5 Unit(s) SubCutaneous before dinner  meclizine 25 milliGRAM(s) Oral every 8 hours  pantoprazole    Tablet 40 milliGRAM(s) Oral before breakfast  sacubitril 24 mG/valsartan 26 mG 1 Tablet(s) Oral two times a day  tamsulosin 0.4 milliGRAM(s) Oral at bedtime    MEDICATIONS  (PRN):  ALBUTerol   0.5% 2.5 milliGRAM(s) Nebulizer two times a day PRN Shortness of Breath and/or Wheezing  dextrose Oral Gel 15 Gram(s) Oral once PRN Blood Glucose LESS THAN 70 milliGRAM(s)/deciliter      08-20-22 @ 14:37      eks sr 1st deg av bl.

## 2022-08-20 NOTE — PROGRESS NOTE ADULT - ASSESSMENT
Patient is an 80yo male with PMH of A.fib, CHF, HTN, CKD, SAMMI, HLD, COPD, DM s/p IPP who present to the emergency room yesterday with 2 days of new onset dizziness worse with movement, being managed for syncope most likely due to vertigo.     #Pre-syncope  #Vertigo vs TIA vs arrythmia  -Patient neuo exam show no focal deficits  -Denies fecal or urine incontinence  -Patient s/p ICD placement, denies feeling shock  -EKG shows possible 1st degree heart block  -Patient denies palpitations follows closely with cardiologist Dr. Holguin   -Shira Hallpike maneuver positive when patients head was turned to the right side  -CT head negative  -TTE ordered  -Orthostatics pending  -PT/OT  - ENT consulted - started on meclizine - will f/u op  - Neuro following - request MRI brain     #Hypernatremia - resolved  #Hyperchloremia - resolved  - off fluids continue to monitor    #AFib  #CHF  #HTN  -Patient slightly hypertensive on admission  -Baseline EKG sinus  -Fluid status euvolemic (no SOB, crackles on auscultation no lower extremity edema)  -c/w Bumex 1mg in the AM   -c/w Cardizem 120mg at noon  -c/w amiodarone 200mg in the am  -c/w Entresto 24mg-26mg BID  -c/w Eliquis 2.5mg BID     #Diabetes  -Sugar well controlled <120 since presentation  -Home jardiance and glimepride  -Lantus 30U at bedtime  -Lispro 5U 15 minutes prior to meals  -Sliding scale for additional coverage    #COPD  -Controlled, on homeO2 2L  -Slightly hypoxic on admission at room air (92%)  -Normalized on 2L (96)  - c/w 2L NC  -c/w albuterol nebulizer prn       #Misc  - DVT Prophylaxis: Eliquis 2.5mg BID  - GI Prophylaxis: Protonix 40mg   - Diet: DASH/Carb consistent  - Activity: As tolerated   - IV Fluids: LR 80ml/hr  - Code Status:    Dispo: Home when stable

## 2022-08-20 NOTE — CHART NOTE - NSCHARTNOTEFT_GEN_A_CORE
Tertiary Trauma Survey (TTS)    Date of TTS: 22 @ 00:47   Admit Date: 22  Trauma Activation: CODE / ALERT / CONSULT  Admit GCS:        E-     V-     M-     HPI:  Patient is an 82yo male with PMH of A.fib, CHF, HTN, CKD, SAMMI, HLD, COPD, DM s/p IPP who present to the emergency room yesterday with 2 days of new onset dizziness which caused him to fall and hit his head last night. For the last 2 days patient has noticed he is more dizzy, feeling like the world is spinning, especially when shifting positions. Patient is s/p ICD placement many years ago and denies feeling his ICD shock him. Last night he was laying down in bed watching TV when he noticed he began to get dizzy. He attempted to stand up and fell down slightly, but was able to get back up. He walked to the bathroom and when he went to sit down, he immedately got extremely dizzy, fell and scraped his arm against the toilet paper rubalcava and hit his head on the wall which prompted him to come to the emergency department.     Patient denies LOC, bowel or urine incontinence, palpitations before or after the fall, and SOB. Patient does state that he has some mild hearing loss in the R ear, but no ringing.     In the ED vital signs:  HR: 77  BP: 178/86   O2: 92% RA (On home O2)  Afebrile    ED work up consisted of CT scan of the head, chest, abdomen, and pelvis which showed no acute pathology in any area of the body. Patient had an EKG which showed possible 1st-degree heart block vs PAC. UA was negative. Troponin was negative.    Patient is admitted for syncope work up  (19 Aug 2022 07:58)    Patient seen and examined.     PHYSICAL EXAM:  General: NAD  HEENT: Normocephalic, atraumatic, EOMI, PEERLA. no scalp lacerations   Neck: Soft, midline trachea. no c-spine tenderness  Chest: No chest wall tenderness, no subcutaneous emphysema   Cardiac: S1, S2, RRR  Respiratory: Bilateral breath sounds, clear and equal bilaterally  Abdomen: Soft, non-distended, non-tender, no rebound, no guarding.  Groin: Normal appearing, pelvis stable   Ext:  Moving b/l upper and lower extremities. Palpable Radial b/l UE, b/l DP palpable in LE.   Back: No T/L/S spine tenderness, No palpable runoff/stepoff/deformity  Rectal: No maci blood, MIGDALIA with good tone    Vital Signs Last 24 Hrs  T(C): 36.4 (19 Aug 2022 20:39), Max: 36.4 (19 Aug 2022 05:55)  T(F): 97.5 (19 Aug 2022 20:39), Max: 97.6 (19 Aug 2022 07:30)  HR: 85 (19 Aug 2022 20:39) (74 - 107)  BP: 125/69 (19 Aug 2022 20:39) (117/73 - 159/80)  BP(mean): 98 (19 Aug 2022 13:55) (79 - 98)  RR: 18 (19 Aug 2022 20:39) (18 - 19)  SpO2: 99% (19 Aug 2022 07:30) (95% - 99%)    Parameters below as of 19 Aug 2022 13:55  Patient On (Oxygen Delivery Method): nasal cannula        Labs:  CAPILLARY BLOOD GLUCOSE      POCT Blood Glucose.: 83 mg/dL (19 Aug 2022 21:39)  POCT Blood Glucose.: 144 mg/dL (19 Aug 2022 16:51)  POCT Blood Glucose.: 145 mg/dL (19 Aug 2022 11:18)  POCT Blood Glucose.: 171 mg/dL (19 Aug 2022 09:27)  POCT Blood Glucose.: 95 mg/dL (19 Aug 2022 08:13)                          13.6   6.16  )-----------( 244      ( 18 Aug 2022 23:50 )             43.2             138  |  103  |  18  ----------------------------<  140<H>  4.0   |  25  |  1.4      Calcium, Total Serum: 9.2 mg/dL (22 @ 11:42)      LFTs:             6.5  | 0.2  | 22       ------------------[93      ( 18 Aug 2022 23:50 )  3.8  | x    | 12          Lipase:10     Amylase:x         Lactate, Blood: 0.9 mmol/L (22 @ 23:50)      Coags:     13.10  ----< 1.14    ( 18 Aug 2022 23:50 )     31.3        CARDIAC MARKERS ( 18 Aug 2022 23:50 )  x     / <0.01 ng/mL / 113 U/L / x     / x              Urinalysis Basic - ( 19 Aug 2022 02:50 )    Color: Light Yellow / Appearance: Clear / S.031 / pH: x  Gluc: x / Ketone: Negative  / Bili: Negative / Urobili: <2 mg/dL   Blood: x / Protein: 30 mg/dL / Nitrite: Negative   Leuk Esterase: Negative / RBC: 1 /HPF / WBC 1 /HPF   Sq Epi: x / Non Sq Epi: 1 /HPF / Bacteria: Negative        RADIOLOGICAL FINDINGS REVIEW:  CXR:    Pelvis Films: < from: Xray Pelvis AP only (22 @ 23:57) >    FINDINGS/  IMPRESSION:    No evidence for acute fracture or dislocation.  Extensive vascular calcifications.    --- End of Report ---      < end of copied text >        C-Spine Films: < from: CT Cervical Spine No Cont (22 @ 00:59) >    IMPRESSION:    No acute displaced fracture or facet joint dislocation.    Multilevel degenerative changes as detailed above.    --- End of Report ---    < end of copied text >      ABD/Pelvis CT: < from: CT Abdomen and Pelvis w/ IV Cont (22 @ 01:14) >      IMPRESSION:    No CT evidence of acute intrathoracic, intra-abdominal, or pelvic   pathology.    Cystic replacement in the pancreatic body appears possibly on the basis   of main duct dilatation measuring up to 1.1 cm, new from prior.   Outpatient contrast-enhanced pancreatic MRI/MRCP is recommended for   further evaluation as neoplasm is not excluded.    Mild thickening of the right apical/upper lobe subpleural reticulation   and linear opacification in the setting of emphysema. While findings   likely reflect apical fibrosis/scarring, recommend 3 month follow-up   chest CT for reevaluate given no priors for comparison.    1.3 cm left thyroid gland nodule. Can consider outpatient thyroid   ultrasound for further evaluation.    --- End of Report ---    Other:    ASSESSMENT/ PLAN:     Patient is an 82yo male with PMH of A.fib, CHF, HTN, CKD, SAMMI, HLD, COPD, DM s/p IPP who present to the emergency room yesterday with 2 days of new onset dizziness which caused him to fall and hit his head last night. For the last 2 days patient has noticed he is more dizzy, feeling like the world is spinning, especially when shifting positions. Patient is s/p ICD placement many years ago and denies feeling his ICD shock him. Last night he was laying down in bed watching TV when he noticed he began to get dizzy. He attempted to stand up and fell down slightly, but was able to get back up. He walked to the bathroom and when he went to sit down, he immedately got extremely dizzy, fell and scraped his arm against the toilet paper rubalcava and hit his head on the wall which prompted him to come to the emergency department.  Trauma assessment in ED: ABCs intact , GCS 15 , AAOx3 , with physical exam findings, imaging, and labs as documented above, injuries are identified:     - cont care per primary team  - no additional injuries found on exam or imaging  - recall trauma surgery PRN
Electrophysiology PA Note    D/w with Dr Cabrera, hospitalist on the case, no need for EP consult at this time  EP consult cancelled

## 2022-08-21 ENCOUNTER — TRANSCRIPTION ENCOUNTER (OUTPATIENT)
Age: 82
End: 2022-08-21

## 2022-08-21 VITALS
SYSTOLIC BLOOD PRESSURE: 152 MMHG | DIASTOLIC BLOOD PRESSURE: 74 MMHG | HEART RATE: 88 BPM | TEMPERATURE: 97 F | RESPIRATION RATE: 18 BRPM

## 2022-08-21 LAB
ANION GAP SERPL CALC-SCNC: 5 MMOL/L — LOW (ref 7–14)
BASOPHILS # BLD AUTO: 0.04 K/UL — SIGNIFICANT CHANGE UP (ref 0–0.2)
BASOPHILS NFR BLD AUTO: 0.6 % — SIGNIFICANT CHANGE UP (ref 0–1)
BUN SERPL-MCNC: 17 MG/DL — SIGNIFICANT CHANGE UP (ref 10–20)
CALCIUM SERPL-MCNC: 9.4 MG/DL — SIGNIFICANT CHANGE UP (ref 8.5–10.1)
CHLORIDE SERPL-SCNC: 106 MMOL/L — SIGNIFICANT CHANGE UP (ref 98–110)
CO2 SERPL-SCNC: 29 MMOL/L — SIGNIFICANT CHANGE UP (ref 17–32)
CREAT SERPL-MCNC: 1.5 MG/DL — SIGNIFICANT CHANGE UP (ref 0.7–1.5)
EGFR: 46 ML/MIN/1.73M2 — LOW
EOSINOPHIL # BLD AUTO: 0.67 K/UL — SIGNIFICANT CHANGE UP (ref 0–0.7)
EOSINOPHIL NFR BLD AUTO: 10.1 % — HIGH (ref 0–8)
GLUCOSE BLDC GLUCOMTR-MCNC: 140 MG/DL — HIGH (ref 70–99)
GLUCOSE BLDC GLUCOMTR-MCNC: 78 MG/DL — SIGNIFICANT CHANGE UP (ref 70–99)
GLUCOSE SERPL-MCNC: 85 MG/DL — SIGNIFICANT CHANGE UP (ref 70–99)
HCT VFR BLD CALC: 42.9 % — SIGNIFICANT CHANGE UP (ref 42–52)
HGB BLD-MCNC: 13.3 G/DL — LOW (ref 14–18)
IMM GRANULOCYTES NFR BLD AUTO: 0.5 % — HIGH (ref 0.1–0.3)
LYMPHOCYTES # BLD AUTO: 1.55 K/UL — SIGNIFICANT CHANGE UP (ref 1.2–3.4)
LYMPHOCYTES # BLD AUTO: 23.3 % — SIGNIFICANT CHANGE UP (ref 20.5–51.1)
MAGNESIUM SERPL-MCNC: 2.2 MG/DL — SIGNIFICANT CHANGE UP (ref 1.8–2.4)
MCHC RBC-ENTMCNC: 28.5 PG — SIGNIFICANT CHANGE UP (ref 27–31)
MCHC RBC-ENTMCNC: 31 G/DL — LOW (ref 32–37)
MCV RBC AUTO: 92.1 FL — SIGNIFICANT CHANGE UP (ref 80–94)
MONOCYTES # BLD AUTO: 0.67 K/UL — HIGH (ref 0.1–0.6)
MONOCYTES NFR BLD AUTO: 10.1 % — HIGH (ref 1.7–9.3)
NEUTROPHILS # BLD AUTO: 3.7 K/UL — SIGNIFICANT CHANGE UP (ref 1.4–6.5)
NEUTROPHILS NFR BLD AUTO: 55.4 % — SIGNIFICANT CHANGE UP (ref 42.2–75.2)
NRBC # BLD: 0 /100 WBCS — SIGNIFICANT CHANGE UP (ref 0–0)
PLATELET # BLD AUTO: 223 K/UL — SIGNIFICANT CHANGE UP (ref 130–400)
POTASSIUM SERPL-MCNC: 4.6 MMOL/L — SIGNIFICANT CHANGE UP (ref 3.5–5)
POTASSIUM SERPL-SCNC: 4.6 MMOL/L — SIGNIFICANT CHANGE UP (ref 3.5–5)
RBC # BLD: 4.66 M/UL — LOW (ref 4.7–6.1)
RBC # FLD: 18 % — HIGH (ref 11.5–14.5)
SODIUM SERPL-SCNC: 140 MMOL/L — SIGNIFICANT CHANGE UP (ref 135–146)
WBC # BLD: 6.66 K/UL — SIGNIFICANT CHANGE UP (ref 4.8–10.8)
WBC # FLD AUTO: 6.66 K/UL — SIGNIFICANT CHANGE UP (ref 4.8–10.8)

## 2022-08-21 PROCEDURE — 70551 MRI BRAIN STEM W/O DYE: CPT | Mod: 26

## 2022-08-21 PROCEDURE — 99239 HOSP IP/OBS DSCHRG MGMT >30: CPT

## 2022-08-21 RX ORDER — GLIMEPIRIDE 1 MG
1 TABLET ORAL
Qty: 0 | Refills: 0 | DISCHARGE

## 2022-08-21 RX ADMIN — Medication 25 MILLIGRAM(S): at 05:47

## 2022-08-21 RX ADMIN — PANTOPRAZOLE SODIUM 40 MILLIGRAM(S): 20 TABLET, DELAYED RELEASE ORAL at 05:48

## 2022-08-21 RX ADMIN — Medication 25 MILLIGRAM(S): at 11:09

## 2022-08-21 RX ADMIN — SACUBITRIL AND VALSARTAN 1 TABLET(S): 24; 26 TABLET, FILM COATED ORAL at 05:47

## 2022-08-21 RX ADMIN — Medication 120 MILLIGRAM(S): at 05:47

## 2022-08-21 RX ADMIN — APIXABAN 2.5 MILLIGRAM(S): 2.5 TABLET, FILM COATED ORAL at 05:47

## 2022-08-21 RX ADMIN — AMIODARONE HYDROCHLORIDE 200 MILLIGRAM(S): 400 TABLET ORAL at 05:46

## 2022-08-21 RX ADMIN — Medication 5 UNIT(S): at 11:31

## 2022-08-21 NOTE — DISCHARGE NOTE PROVIDER - CARE PROVIDER_API CALL
Cy Holguin)  Cardiovascular Disease  [address], [address]  [place] [address]  Phone: [phone]  Fax: [phone]  Follow Up Time:     John Almanzar)  Otolaryngology  [address], [address] [place] [address]  Phone: [phone]  Fax: [phone]  Follow Up Time:     Saeed Byers)  Neurology  [address] [place] [address]  Phone: [phone]  Fax: [phone]  Follow Up Time:     Pardeep Palomares  Phone: (   )    -  Fax: (   )    -  Follow Up Time:

## 2022-08-21 NOTE — DISCHARGE NOTE PROVIDER - CARE PROVIDERS DIRECT ADDRESSES
,DirectAddress_Unknown,[email or web address],DirectAddress_Unknown,DirectAddress_Unknown

## 2022-08-21 NOTE — DISCHARGE NOTE PROVIDER - NSDCFUSCHEDAPPT_GEN_ALL_CORE_FT
Ravindra, Clare [facility]  UROLOGY [address]  Scheduled Appointment: 09/26/2022

## 2022-08-21 NOTE — DISCHARGE NOTE PROVIDER - NSDCHHATTENDCERT_GEN_ALL_CORE
Abdominal Pain, N/V/D
My signature below certifies that the above stated patient is homebound and upon completion of the Face-To-Face encounter, has the need for intermittent skilled nursing, physical therapy and/or speech or occupational therapy services in their home for their current diagnosis as outlined in their initial plan of care. These services will continue to be monitored by myself or another physician.

## 2022-08-21 NOTE — DISCHARGE NOTE PROVIDER - PROVIDER TOKENS
PROVIDER:[TOKEN:[[id number]]],PROVIDER:[TOKEN:[[id number]]],PROVIDER:[TOKEN:[[id number]]],FREE:[LAST:[Rebecato],FIRST:[Pardeep],PHONE:[(   )    -],FAX:[(   )    -]]

## 2022-08-21 NOTE — DISCHARGE NOTE PROVIDER - NSDCFUADDAPPT_GEN_ALL_CORE_FT
Please follow up with your primary care doctor, ENT doctor in one week.    Please follow up with your primary care doctor regarding  1. Thyroid nodule  check ultrasound  2. Lung thickening  outpt f/u with repeat ct in 3mos  3. Pancreatic duct dilation  outpt f/u with mri/ mrcp

## 2022-08-21 NOTE — DISCHARGE NOTE PROVIDER - NSDCCPCAREPLAN_GEN_ALL_CORE_FT
PRINCIPAL DISCHARGE DIAGNOSIS  Diagnosis: Vertigo  Assessment and Plan of Treatment:       SECONDARY DISCHARGE DIAGNOSES  Diagnosis: Fall from standing, initial encounter  Assessment and Plan of Treatment:     Diagnosis: Thyroid nodule  Assessment and Plan of Treatment:

## 2022-08-21 NOTE — DISCHARGE NOTE PROVIDER - HOSPITAL COURSE
81M PMHx AFib on eliquis, HTN, HFpEF, HLD, COPD, DM2 here with dizziness. Dizziness/ veritgo began suddenly, awoke pt from sleep; lasted 4 min.  No tinnitius, hearing loss; had fall but no syncope. Underwent cth which was neg. Admitted to medicine service. Seen by ENT with positive johnathon- hallpike. Suspected bppv. Seen by neuro. He underwent mri which was neg for acute stroke. He was stable for d/c on 8/21, needs outpt pmd, neuro, ent f/u one week.

## 2022-08-21 NOTE — DISCHARGE NOTE PROVIDER - NSDCMRMEDTOKEN_GEN_ALL_CORE_FT
albuterol 5 mg/mL (0.5%) inhalation solution: 0.5 milliliter(s) inhaled 2 times a day  amiodarone 200 mg oral tablet: 1 tab(s) orally once a day  atorvastatin 40 mg oral tablet: 1 tab(s) orally once a day (at bedtime)  bumetanide 1 mg oral tablet: 1 tab(s) orally once a day  dilTIAZem 120 mg oral tablet: 1 tab(s) orally once a day  Eliquis 2.5 mg oral tablet: 1 tab(s) orally 2 times a day  Entresto 24 mg-26 mg oral tablet: 1 tab(s) orally once a day  glimepiride 2 mg oral tablet: 1 tab(s) orally once a day  Jardiance 25 mg oral tablet: 1 tab(s) orally once a day (in the morning)  pantoprazole 40 mg oral delayed release tablet: 1 tab(s) orally once a day (before a meal)  Symbicort 160 mcg-4.5 mcg/inh inhalation aerosol: 2 puff(s) inhaled 2 times a day  tamsulosin 0.4 mg oral capsule: 1 cap(s) orally once a day (at bedtime)   albuterol 5 mg/mL (0.5%) inhalation solution: 0.5 milliliter(s) inhaled 2 times a day  amiodarone 200 mg oral tablet: 1 tab(s) orally once a day  atorvastatin 40 mg oral tablet: 1 tab(s) orally once a day (at bedtime)  dilTIAZem 120 mg oral tablet: 1 tab(s) orally once a day  Eliquis 2.5 mg oral tablet: 1 tab(s) orally 2 times a day  Entresto 24 mg-26 mg oral tablet: 1 tab(s) orally once a day  Jardiance 25 mg oral tablet: 1 tab(s) orally once a day (in the morning)  Symbicort 160 mcg-4.5 mcg/inh inhalation aerosol: 2 puff(s) inhaled 2 times a day  tamsulosin 0.4 mg oral capsule: 1 cap(s) orally once a day (at bedtime)

## 2022-08-21 NOTE — DISCHARGE NOTE NURSING/CASE MANAGEMENT/SOCIAL WORK - NSDCVIVACCINE_GEN_ALL_CORE_FT
Tdap; 18-Aug-2022 23:11; Blanquita Krause (RN); [employer]; [id number] (Exp. Date: 11-Mar-2024); IntraMuscular; Deltoid Right.; 0.5 milliLiter(s); VIS (VIS Published: 09-May-2013, VIS Presented: 18-Aug-2022);

## 2022-08-21 NOTE — DISCHARGE NOTE NURSING/CASE MANAGEMENT/SOCIAL WORK - PATIENT PORTAL LINK FT
You can access the FollowMyHealth Patient Portal offered by [facility] by registering at the following website: [email or web address]. By joining [employer]’s FollowMyHealth portal, you will also be able to view your health information using other applications (apps) compatible with our system.

## 2022-08-21 NOTE — DISCHARGE NOTE NURSING/CASE MANAGEMENT/SOCIAL WORK - NSDCPEFALRISK_GEN_ALL_CORE
For information on Fall & Injury Prevention, visit: [email or web address] OR  [email or web address] OR  https://www.cdc.gov/steadi/patient.html

## 2022-08-21 NOTE — PROGRESS NOTE ADULT - SUBJECTIVE AND OBJECTIVE BOX
INTERVAL HPI/OVERNIGHT EVENTS:    SUBJECTIVE: Patient seen and examined at bedside.     cc: dizziness  no cp, sob, abd pain, fever  no ha, dizziness, lightheadedness, syncope    OBJECTIVE:    VITAL SIGNS:  Vital Signs Last 24 Hrs  T(C): 36.3 (21 Aug 2022 05:13), Max: 36.7 (20 Aug 2022 13:32)  T(F): 97.4 (21 Aug 2022 05:13), Max: 98.1 (20 Aug 2022 13:32)  HR: 88 (21 Aug 2022 05:13) (80 - 88)  BP: 152/74 (21 Aug 2022 05:13) (106/56 - 153/83)  BP(mean): 108 (20 Aug 2022 20:47) (108 - 108)  RR: 18 (21 Aug 2022 05:13) (18 - 18)  SpO2: --          PHYSICAL EXAM:    General: NAD  HEENT: NC/AT; PERRL, clear conjunctiva  Neck: supple  Respiratory: CTA b/l  Cardiovascular: +S1/S2; RRR  Abdomen: soft, NT/ND; +BS x4  Extremities: WWP, 2+ peripheral pulses b/l; no LE edema  Skin: normal color and turgor; no rash  Neurological:    MEDICATIONS:  MEDICATIONS  (STANDING):  aMIOdarone    Tablet 200 milliGRAM(s) Oral daily  apixaban 2.5 milliGRAM(s) Oral every 12 hours  atorvastatin 40 milliGRAM(s) Oral at bedtime  dextrose 5%. 1000 milliLiter(s) (50 mL/Hr) IV Continuous <Continuous>  dextrose 50% Injectable 25 Gram(s) IV Push once  diltiazem    milliGRAM(s) Oral daily  glucagon  Injectable 1 milliGRAM(s) IntraMuscular once  insulin glargine Injectable (LANTUS) 15 Unit(s) SubCutaneous at bedtime  insulin lispro (ADMELOG) corrective regimen sliding scale   SubCutaneous three times a day before meals  insulin lispro Injectable (ADMELOG) 5 Unit(s) SubCutaneous before breakfast  insulin lispro Injectable (ADMELOG) 5 Unit(s) SubCutaneous before lunch  insulin lispro Injectable (ADMELOG) 5 Unit(s) SubCutaneous before dinner  meclizine 25 milliGRAM(s) Oral every 8 hours  pantoprazole    Tablet 40 milliGRAM(s) Oral before breakfast  sacubitril 24 mG/valsartan 26 mG 1 Tablet(s) Oral two times a day  tamsulosin 0.4 milliGRAM(s) Oral at bedtime    MEDICATIONS  (PRN):  ALBUTerol   0.5% 2.5 milliGRAM(s) Nebulizer two times a day PRN Shortness of Breath and/or Wheezing  dextrose Oral Gel 15 Gram(s) Oral once PRN Blood Glucose LESS THAN 70 milliGRAM(s)/deciliter      ALLERGIES:  Allergies    No Known Allergies    Intolerances        LABS:                        13.3   6.66  )-----------( 223      ( 21 Aug 2022 07:20 )             42.9     Hemoglobin: 13.3 g/dL (08-21 @ 07:20)  Hemoglobin: 13.3 g/dL (08-20 @ 06:52)  Hemoglobin: 13.6 g/dL (08-18 @ 23:50)    CBC Full  -  ( 21 Aug 2022 07:20 )  WBC Count : 6.66 K/uL  RBC Count : 4.66 M/uL  Hemoglobin : 13.3 g/dL  Hematocrit : 42.9 %  Platelet Count - Automated : 223 K/uL  Mean Cell Volume : 92.1 fL  Mean Cell Hemoglobin : 28.5 pg  Mean Cell Hemoglobin Concentration : 31.0 g/dL  Auto Neutrophil # : 3.70 K/uL  Auto Lymphocyte # : 1.55 K/uL  Auto Monocyte # : 0.67 K/uL  Auto Eosinophil # : 0.67 K/uL  Auto Basophil # : 0.04 K/uL  Auto Neutrophil % : 55.4 %  Auto Lymphocyte % : 23.3 %  Auto Monocyte % : 10.1 %  Auto Eosinophil % : 10.1 %  Auto Basophil % : 0.6 %    08-21    140  |  106  |  17  ----------------------------<  85  4.6   |  29  |  1.5    Ca    9.4      21 Aug 2022 07:20  Mg     2.2     08-21      Creatinine Trend: 1.5<--, 1.4<--, 1.4<--, 1.7<--        hs Troponin:              CSF:                      EKG:   MICROBIOLOGY:    IMAGING:      Labs, imaging, EKG personally reviewed    RADIOLOGY & ADDITIONAL TESTS: Reviewed.
FARIHA KHOURY 81y Male  MRN#: [id number]   Hospital Day: 1d    SUBJECTIVE  Patient is a 81y old Male who presents with a chief complaint of Dizziness and fall (19 Aug 2022 18:01)  Currently admitted to medicine with the primary diagnosis of Vertigo      INTERVAL HPI AND OVERNIGHT EVENTS:  Patient was examined and seen at bedside. This morning he is resting comfortably in bed and reports no issues or overnight events. Vitals stable, voiding appropriately, having good BM and endorsing good appetite.         OBJECTIVE  PAST MEDICAL & SURGICAL HISTORY  HTN (hypertension)    HLD (hyperlipidemia)    SAMMI (obstructive sleep apnea)    CAD (coronary artery disease)  coronary stent x 1    DM (diabetes mellitus), type 2    COPD (chronic obstructive pulmonary disease)    Asthma    Atrial fibrillation    CHF (congestive heart failure)    History of  novel coronavirus disease (COVID-19)      CKD (chronic kidney disease)    History of penile implant    H/O shoulder surgery  right    History of appendectomy    History of hernia repair  abdominal    History of corneal transplant    S/P coronary angioplasty    History of percutaneous angioplasty      ALLERGIES:  No Known Allergies    MEDICATIONS:  STANDING MEDICATIONS  aMIOdarone    Tablet 200 milliGRAM(s) Oral daily  apixaban 2.5 milliGRAM(s) Oral every 12 hours  atorvastatin 40 milliGRAM(s) Oral at bedtime  dextrose 5%. 1000 milliLiter(s) IV Continuous <Continuous>  dextrose 50% Injectable 25 Gram(s) IV Push once  diltiazem    milliGRAM(s) Oral daily  glucagon  Injectable 1 milliGRAM(s) IntraMuscular once  insulin glargine Injectable (LANTUS) 15 Unit(s) SubCutaneous at bedtime  insulin lispro (ADMELOG) corrective regimen sliding scale   SubCutaneous three times a day before meals  insulin lispro Injectable (ADMELOG) 5 Unit(s) SubCutaneous before breakfast  insulin lispro Injectable (ADMELOG) 5 Unit(s) SubCutaneous before lunch  insulin lispro Injectable (ADMELOG) 5 Unit(s) SubCutaneous before dinner  meclizine 25 milliGRAM(s) Oral every 8 hours  pantoprazole    Tablet 40 milliGRAM(s) Oral before breakfast  sacubitril 24 mG/valsartan 26 mG 1 Tablet(s) Oral two times a day  tamsulosin 0.4 milliGRAM(s) Oral at bedtime    PRN MEDICATIONS  ALBUTerol   0.5% 2.5 milliGRAM(s) Nebulizer two times a day PRN  dextrose Oral Gel 15 Gram(s) Oral once PRN      VITAL SIGNS: Last 24 Hours  T(C): 35.8 (20 Aug 2022 04:50), Max: 36.4 (19 Aug 2022 20:39)  T(F): 96.5 (20 Aug 2022 04:50), Max: 97.5 (19 Aug 2022 20:39)  HR: 85 (20 Aug 2022 04:50) (81 - 85)  BP: 125/72 (20 Aug 2022 04:50) (125/69 - 136/76)  BP(mean): 98 (19 Aug 2022 13:55) (98 - 98)  RR: 18 (20 Aug 2022 04:50) (18 - 18)  SpO2: --    LABS:                        13.3   7.15  )-----------( 227      ( 20 Aug 2022 06:52 )             43.2     08-20    143  |  107  |  17  ----------------------------<  88  4.3   |  27  |  1.4    Ca    9.5      20 Aug 2022 06:52  Phos  3.0     08-19  Mg     2.1     08-20    TPro  6.5  /  Alb  3.8  /  TBili  0.2  /  DBili  x   /  AST  22  /  ALT  12  /  AlkPhos  93  08-18    PT/INR - ( 18 Aug 2022 23:50 )   PT: 13.10 sec;   INR: 1.14 ratio         PTT - ( 18 Aug 2022 23:50 )  PTT:31.3 sec  Urinalysis Basic - ( 19 Aug 2022 02:50 )    Color: Light Yellow / Appearance: Clear / S.031 / pH: x  Gluc: x / Ketone: Negative  / Bili: Negative / Urobili: <2 mg/dL   Blood: x / Protein: 30 mg/dL / Nitrite: Negative   Leuk Esterase: Negative / RBC: 1 /HPF / WBC 1 /HPF   Sq Epi: x / Non Sq Epi: 1 /HPF / Bacteria: Negative            CARDIAC MARKERS ( 18 Aug 2022 23:50 )  x     / <0.01 ng/mL / 113 U/L / x     / x          RADIOLOGY:  CT CAP, cervical spine, head :   No CT evidence of acute intrathoracic, intra-abdominal, or pelvic   pathology.    Cystic replacement in the pancreatic body appears possibly on the basis   of main duct dilatation measuring up to 1.1 cm, new from prior.   Outpatient contrast-enhanced pancreatic MRI/MRCP is recommended for   further evaluation as neoplasm is not excluded.    Mild thickening of the right apical/upper lobe subpleural reticulation   and linear opacification in the setting of emphysema. While findings   likely reflect apical fibrosis/scarring, recommend 3 month follow-up   chest CT for reevaluate given no priors for comparison.    1.3 cm left thyroid gland nodule. Can consider outpatient thyroid   ultrasound for further evaluation.    No acute displaced fracture or facet joint dislocation.    Multilevel degenerative changes    PHYSICAL EXAM:  CONSTITUTIONAL: No acute distress, well-developed, well-groomed, AAOx3  PULMONARY: Clear to auscultation bilaterally; no wheezes, rales, or rhonchi  CARDIOVASCULAR: Regular rate and rhythm; no murmurs, rubs, or gallops  GASTROINTESTINAL: Soft, non-tender, non-distended; bowel sounds present  MUSCULOSKELETAL: 2+ peripheral pulses; no clubbing, no cyanosis, no edema  SKIN: No rashes or lesions; warm and dry

## 2022-08-25 DIAGNOSIS — G47.33 OBSTRUCTIVE SLEEP APNEA (ADULT) (PEDIATRIC): ICD-10-CM

## 2022-08-25 DIAGNOSIS — K86.89 OTHER SPECIFIED DISEASES OF PANCREAS: ICD-10-CM

## 2022-08-25 DIAGNOSIS — R91.8 OTHER NONSPECIFIC ABNORMAL FINDING OF LUNG FIELD: ICD-10-CM

## 2022-08-25 DIAGNOSIS — N17.9 ACUTE KIDNEY FAILURE, UNSPECIFIED: ICD-10-CM

## 2022-08-25 DIAGNOSIS — E11.22 TYPE 2 DIABETES MELLITUS WITH DIABETIC CHRONIC KIDNEY DISEASE: ICD-10-CM
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
## 2022-08-25 DIAGNOSIS — I50.32 CHRONIC DIASTOLIC (CONGESTIVE) HEART FAILURE: ICD-10-CM

## 2022-08-25 DIAGNOSIS — I13.0 HYPERTENSIVE HEART AND CHRONIC KIDNEY DISEASE WITH HEART FAILURE AND STAGE 1 THROUGH STAGE 4 CHRONIC KIDNEY DISEASE, OR UNSPECIFIED CHRONIC KIDNEY DISEASE: ICD-10-CM

## 2022-08-25 DIAGNOSIS — E87.0 HYPEROSMOLALITY AND HYPERNATREMIA: ICD-10-CM

## 2022-08-25 DIAGNOSIS — I44.0 ATRIOVENTRICULAR BLOCK, FIRST DEGREE: ICD-10-CM

## 2022-08-25 DIAGNOSIS — E11.51 TYPE 2 DIABETES MELLITUS WITH DIABETIC PERIPHERAL ANGIOPATHY WITHOUT GANGRENE: ICD-10-CM

## 2022-08-25 DIAGNOSIS — I48.0 PAROXYSMAL ATRIAL FIBRILLATION: ICD-10-CM

## 2022-08-25 DIAGNOSIS — N18.30 CHRONIC KIDNEY DISEASE, STAGE 3 UNSPECIFIED: ICD-10-CM

## 2022-08-25 DIAGNOSIS — I25.10 ATHEROSCLEROTIC HEART DISEASE OF NATIVE CORONARY ARTERY WITHOUT ANGINA PECTORIS: ICD-10-CM

## 2022-08-25 DIAGNOSIS — Z79.84 LONG TERM (CURRENT) USE OF ORAL HYPOGLYCEMIC DRUGS: ICD-10-CM

## 2022-08-25 DIAGNOSIS — Z95.810 PRESENCE OF AUTOMATIC (IMPLANTABLE) CARDIAC DEFIBRILLATOR: ICD-10-CM
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
## 2022-08-25 DIAGNOSIS — E04.1 NONTOXIC SINGLE THYROID NODULE: ICD-10-CM

## 2022-08-25 DIAGNOSIS — Z79.01 LONG TERM (CURRENT) USE OF ANTICOAGULANTS: ICD-10-CM

## 2022-08-25 DIAGNOSIS — H81.10 BENIGN PAROXYSMAL VERTIGO, UNSPECIFIED EAR: ICD-10-CM

## 2022-08-25 DIAGNOSIS — R42 DIZZINESS AND GIDDINESS: ICD-10-CM

## 2022-08-25 DIAGNOSIS — E87.8 OTHER DISORDERS OF ELECTROLYTE AND FLUID BALANCE, NOT ELSEWHERE CLASSIFIED: ICD-10-CM

## 2022-08-25 DIAGNOSIS — Z95.5 PRESENCE OF CORONARY ANGIOPLASTY IMPLANT AND GRAFT: ICD-10-CM

## 2022-08-25 DIAGNOSIS — J44.9 CHRONIC OBSTRUCTIVE PULMONARY DISEASE, UNSPECIFIED: ICD-10-CM

## 2022-09-26 ENCOUNTER — APPOINTMENT (OUTPATIENT)
Dept: UROLOGY | Facility: CLINIC | Age: 82
End: 2022-09-26

## 2022-10-10 ENCOUNTER — APPOINTMENT (OUTPATIENT)
Dept: UROLOGY | Facility: CLINIC | Age: 82
End: 2022-10-10

## 2022-10-10 VITALS
HEIGHT: 67 IN | BODY MASS INDEX: 32.96 KG/M2 | TEMPERATURE: 97.3 F | WEIGHT: 210 LBS | DIASTOLIC BLOOD PRESSURE: 70 MMHG | SYSTOLIC BLOOD PRESSURE: 124 MMHG

## 2022-10-10 DIAGNOSIS — E11.59 TYPE 2 DIABETES MELLITUS WITH OTHER CIRCULATORY COMPLICATIONS: ICD-10-CM

## 2022-10-10 DIAGNOSIS — R35.0 FREQUENCY OF MICTURITION: ICD-10-CM

## 2022-10-10 DIAGNOSIS — T83.490A OTHER MECHANICAL COMPLICATION OF IMPLANTED PENILE PROSTHESIS, INITIAL ENCOUNTER: ICD-10-CM

## 2022-10-10 DIAGNOSIS — N52.1 TYPE 2 DIABETES MELLITUS WITH OTHER CIRCULATORY COMPLICATIONS: ICD-10-CM

## 2022-10-10 PROCEDURE — 51741 ELECTRO-UROFLOWMETRY FIRST: CPT

## 2022-10-10 PROCEDURE — 51725 SIMPLE CYSTOMETROGRAM: CPT

## 2022-10-10 PROCEDURE — 99214 OFFICE O/P EST MOD 30 MIN: CPT | Mod: 25

## 2022-10-10 NOTE — ASSESSMENT
[FreeTextEntry1] : BLADDER SCAN:\par Indication: Increased frequency of urination. \par Initial Volume: 289   cc\par PVR:  0 cc\par Results: Stress urinary incontinence. \par Recommendations: No Therapy Needed.\par \par \par URO FLOWMETRY:\par Indication: Increased frequency of urination. \par Urinary Flow Rate:  14.0  m/s\par Results: Stress urinary incontinence    \par Recommendations: No therapy needed.\par \par

## 2022-10-10 NOTE — HISTORY OF PRESENT ILLNESS
[FreeTextEntry1] : The patient is unable to deflate the implant.  He is able to inflate the device.  He is here to discuss how to use the implant.

## 2023-03-27 ENCOUNTER — APPOINTMENT (OUTPATIENT)
Dept: CARDIOLOGY | Facility: CLINIC | Age: 83
End: 2023-03-27
Payer: MEDICARE

## 2023-03-27 VITALS
OXYGEN SATURATION: 98 % | BODY MASS INDEX: 33.59 KG/M2 | SYSTOLIC BLOOD PRESSURE: 130 MMHG | DIASTOLIC BLOOD PRESSURE: 70 MMHG | HEART RATE: 71 BPM | HEIGHT: 67 IN | WEIGHT: 214 LBS

## 2023-03-27 DIAGNOSIS — M79.606 PAIN IN LEG, UNSPECIFIED: ICD-10-CM

## 2023-03-27 DIAGNOSIS — I73.9 PERIPHERAL VASCULAR DISEASE, UNSPECIFIED: ICD-10-CM

## 2023-03-27 PROCEDURE — 99204 OFFICE O/P NEW MOD 45 MIN: CPT

## 2023-03-27 PROCEDURE — 93925 LOWER EXTREMITY STUDY: CPT

## 2023-03-27 RX ORDER — TAMSULOSIN HYDROCHLORIDE 0.4 MG/1
0.4 CAPSULE ORAL
Qty: 60 | Refills: 3 | Status: ACTIVE | COMMUNITY

## 2023-03-27 RX ORDER — CEPHALEXIN 250 MG/1
250 CAPSULE ORAL
Qty: 84 | Refills: 0 | Status: DISCONTINUED | COMMUNITY
Start: 2022-06-20 | End: 2023-03-27

## 2023-03-27 RX ORDER — DILTIAZEM HYDROCHLORIDE 120 MG/1
120 CAPSULE, COATED, EXTENDED RELEASE ORAL
Refills: 0 | Status: DISCONTINUED | COMMUNITY
End: 2023-03-27

## 2023-03-27 NOTE — PHYSICAL EXAM
[General Appearance - Well Developed] : well developed [General Appearance - Well Nourished] : well nourished [Normal Conjunctiva] : the conjunctiva exhibited no abnormalities [Normal Oral Mucosa] : normal oral mucosa [Heart Rate And Rhythm] : heart rate and rhythm were normal [Heart Sounds] : normal S1 and S2 [Murmurs] : no murmurs present [1+] : left 1+ [Respiration, Rhythm And Depth] : normal respiratory rhythm and effort [Bowel Sounds] : normal bowel sounds [Abdomen Soft] : soft [Abnormal Walk] : normal gait [Skin Turgor] : normal skin turgor [] : no rash [Skin Lesions] : no skin lesions [No Skin Ulcers] : no skin ulcer [Oriented To Time, Place, And Person] : oriented to person, place, and time [Impaired Insight] : insight and judgment were intact

## 2023-03-28 NOTE — ASSESSMENT
[FreeTextEntry1] : 83 yo M\par past CV history of PAF (sp multiple CV;  on eliquis 2.5 bid, cardizem 120, amio 200),  CHF (on entresto bid),  CAD sp prior stents to prox LAD and oRCA 2013 (on eliquis,  statin),  PAD sp prior bypass and stent (per pt memory likely on both legs at separate times,  with LLE being the 'worse leg').  \par other PMH of PH / COPD / Emphysema /  SAMMI on bipap, CKD, DM, HTN, DL, covid 2020. \par former smoker (75PY history,  quit 30y prior)\par - referred by Dr. Holguin for LLE pains and possible rest pains. \par Pt currently c/o LLE rest pains that started several months prior.  \par \par =======\par IMPRESSION\par LLE pains / Possible rest pain\par Known PAD with prior reported hx of LE bypass/interventions. \par \par \par PLAN\par - Le arterial duplex today\par - F/u in 4-6 weeks after arterial duplex completed. \par \par \par \par \par I, Dr. Rodriguez, personally performed the evaluation and management (E/M) services for this new patient. That E/M includes conducting the clinically appropriate initial history &/or exam, assessing all conditions, and establishing the plan of care. Today, my fellow, Dr. Reeves, was here to observe &/or participate in the visit & follow plan of care established by me.\par \par \par

## 2023-03-28 NOTE — HISTORY OF PRESENT ILLNESS
[FreeTextEntry1] : 81 yo M\par past CV history of PAF (sp multiple CV;  on eliquis 2.5 bid, cardizem 120, amio 200),  CHF (on entresto bid),  CAD sp prior stents to prox LAD and oRCA 2013 (on eliquis,  statin),  PAD sp prior bypass and stent (per pt memory likely on both legs at separate times,  with LLE being the 'worse leg').  \par other PMH of PH / COPD / Emphysema /  SAMMI on bipap, CKD, DM, HTN, DL, covid 2020. \par former smoker (75PY history,  quit 30y prior)\par - referred by Dr. Holguin for LLE pains.  \par \par Pt currently c/o LLE rest pains that started several months prior.  \par \par 2 years ago was able to walk down the [place] 3x/week without issues.   however due to his underlying pulm disease is now unable to due to smalls.   Currently gets SOB after walking 1 block.  \par \par Over past couple of months have been feeling LLE pains during sleep.  Alleviated by moving around.   No ulcers/skin breaks.  Exertion limited by SMALLS.  \par \par \par \par

## 2023-03-28 NOTE — REVIEW OF SYSTEMS
[SOB] : shortness of breath [Dyspnea on exertion] : dyspnea during exertion [Leg Claudication] : intermittent leg claudication [Fever] : no fever [Chills] : no chills [Earache] : no earache [Palpitations] : no palpitations [Syncope] : no syncope [Cough] : no cough [Abdominal Pain] : no abdominal pain [Change in Appetite] : no change in appetite [Urinary Frequency] : no change in urinary frequency [Joint Swelling] : no joint swelling [Dizziness] : no dizziness [Convulsions] : no convulsions [Limb Weakness (Paresis)] : no limb weakness (Paresis)

## 2023-04-28 ENCOUNTER — APPOINTMENT (OUTPATIENT)
Dept: CARDIOLOGY | Facility: CLINIC | Age: 83
End: 2023-04-28

## 2023-05-05 ENCOUNTER — APPOINTMENT (OUTPATIENT)
Dept: CARDIOLOGY | Facility: CLINIC | Age: 83
End: 2023-05-05

## 2023-06-01 ENCOUNTER — OUTPATIENT (OUTPATIENT)
Dept: OUTPATIENT SERVICES | Facility: HOSPITAL | Age: 83
LOS: 1 days | End: 2023-06-01
Payer: MEDICARE

## 2023-06-01 DIAGNOSIS — Z98.890 OTHER SPECIFIED POSTPROCEDURAL STATES: Chronic | ICD-10-CM

## 2023-06-01 DIAGNOSIS — Z96.0 PRESENCE OF UROGENITAL IMPLANTS: Chronic | ICD-10-CM

## 2023-06-01 DIAGNOSIS — Z94.7 CORNEAL TRANSPLANT STATUS: Chronic | ICD-10-CM

## 2023-06-01 DIAGNOSIS — Z98.61 CORONARY ANGIOPLASTY STATUS: Chronic | ICD-10-CM

## 2023-06-01 DIAGNOSIS — R13.14 DYSPHAGIA, PHARYNGOESOPHAGEAL PHASE: ICD-10-CM

## 2023-06-01 DIAGNOSIS — Z90.49 ACQUIRED ABSENCE OF OTHER SPECIFIED PARTS OF DIGESTIVE TRACT: Chronic | ICD-10-CM

## 2023-06-01 PROCEDURE — 92610 EVALUATE SWALLOWING FUNCTION: CPT | Mod: GN

## 2023-06-02 DIAGNOSIS — R13.14 DYSPHAGIA, PHARYNGOESOPHAGEAL PHASE: ICD-10-CM

## 2023-06-20 ENCOUNTER — OUTPATIENT (OUTPATIENT)
Dept: OUTPATIENT SERVICES | Facility: HOSPITAL | Age: 83
LOS: 1 days | End: 2023-06-20
Payer: MEDICARE

## 2023-06-20 DIAGNOSIS — Z94.7 CORNEAL TRANSPLANT STATUS: Chronic | ICD-10-CM

## 2023-06-20 DIAGNOSIS — R13.14 DYSPHAGIA, PHARYNGOESOPHAGEAL PHASE: ICD-10-CM

## 2023-06-20 DIAGNOSIS — Z90.49 ACQUIRED ABSENCE OF OTHER SPECIFIED PARTS OF DIGESTIVE TRACT: Chronic | ICD-10-CM

## 2023-06-20 DIAGNOSIS — Z00.8 ENCOUNTER FOR OTHER GENERAL EXAMINATION: ICD-10-CM

## 2023-06-20 DIAGNOSIS — Z98.890 OTHER SPECIFIED POSTPROCEDURAL STATES: Chronic | ICD-10-CM

## 2023-06-20 DIAGNOSIS — Z96.0 PRESENCE OF UROGENITAL IMPLANTS: Chronic | ICD-10-CM

## 2023-06-20 DIAGNOSIS — Z98.61 CORONARY ANGIOPLASTY STATUS: Chronic | ICD-10-CM

## 2023-06-20 DIAGNOSIS — R13.10 DYSPHAGIA, UNSPECIFIED: ICD-10-CM

## 2023-06-20 PROCEDURE — 74220 X-RAY XM ESOPHAGUS 1CNTRST: CPT

## 2023-06-20 PROCEDURE — 74220 X-RAY XM ESOPHAGUS 1CNTRST: CPT | Mod: 26

## 2023-06-21 DIAGNOSIS — R13.14 DYSPHAGIA, PHARYNGOESOPHAGEAL PHASE: ICD-10-CM

## 2023-08-30 ENCOUNTER — APPOINTMENT (OUTPATIENT)
Dept: NEUROLOGY | Facility: CLINIC | Age: 83
End: 2023-08-30
Payer: MEDICARE

## 2023-08-30 VITALS
DIASTOLIC BLOOD PRESSURE: 79 MMHG | HEART RATE: 78 BPM | HEIGHT: 69 IN | WEIGHT: 208 LBS | BODY MASS INDEX: 30.81 KG/M2 | SYSTOLIC BLOOD PRESSURE: 160 MMHG

## 2023-08-30 DIAGNOSIS — R68.89 OTHER GENERAL SYMPTOMS AND SIGNS: ICD-10-CM

## 2023-08-30 PROCEDURE — 99204 OFFICE O/P NEW MOD 45 MIN: CPT

## 2023-08-30 NOTE — ASSESSMENT
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 He may also have polyneuropathy related to diabetes. 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 At this time, I recommended MRI of the brain. I also recommended neuropsychological evaluation due to his lack of education. We will see him back in follow up when work up is complete. It was recommended he not operate a vehicle.   Total clinician time spent today on the patient is 40 minutes including preparing to see the patient, obtaining and/or reviewing and confirming history, performing medically necessary and appropriate examination, counseling and educating the patient and/or family, documenting clinical information in the EHR and communicating and/or referring to other healthcare professionals.  Entered by Shayla Queen acting as scribe for Dr. Torres.   The documentation recorded by the scribe, in my presence, accurately reflects the service I personally performed, and the decisions made by me with my edits as appropriate.  Keshav Torres MD, FAAN, FACP Diplomate American Board of Psychiatry & Neurology

## 2023-08-30 NOTE — HISTORY OF PRESENT ILLNESS
[FreeTextEntry1] : Mr. Sena is an 82-year-old male who presents today in neurologic consultation accompanied by his daughter for forgetfulness and memory deficit. He states he has had this for a number of months, but it seems to be getting worse. 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 He did not go to school for formal education. He left after the 2nd grade. Apparently, he was in fights and just did not continue. Patient currently has forgetfulness and then remembers after a while what he was trying to recall. He does drive. Occasionally, he will forget how to drive to a familiar place, but if he stops and thinks about it, he will remember. He denies getting lost.   Patient also has difficulty with ambulation and gait as well as balance. He does have stents in his legs for peripheral vascular disease.

## 2023-08-30 NOTE — PHYSICAL EXAM
[FreeTextEntry1] : PHYSICAL EXAMINATION: Head: Normocephalic, atraumatic. Negative TA tenderness/prominence.   Neck: Supple with full range of motion; nontender with negative bilateral Spurling's signs.   Spine: Full range of motion; nontender. Negative straight leg raise maneuvers.   Extremities: Non-tender. Atraumatic. Negative Tinel's signs.   NEUROLOGICAL EXAMINATION:   Mental Status: Patient scored 18/30 on the MMSE. The principal deficits were in recall and spelling. This may have to do with his lack of schooling.   Cranial Nerves Cranial Nerves:   II, III, IV, VI: Corneal transplants.   V: Normal jaw movements. Normal facial sensation.   VII: Normal facial motor testing.   VIII: [place].   IX, X: Palate moves symmetrically. No dysarthria.   XI: Normal shoulder shrug and sternocleidomastoid power.   XII: Tongue appears normal and protrudes in the midline.   Motor: Adequate strength in his UEs and LEs.  Muscle Stretch Reflexes (right/left): Depressed ankle jerks bilaterally.  Sensation: Diminished below the knees.  Gait and Station: Lurching, unsteady gait. Tandem not able to be performed. Unsteady Romberg.

## 2023-09-21 ENCOUNTER — APPOINTMENT (OUTPATIENT)
Dept: MRI IMAGING | Facility: CLINIC | Age: 83
End: 2023-09-21
Payer: MEDICARE

## 2023-09-21 PROCEDURE — 70551 MRI BRAIN STEM W/O DYE: CPT

## 2024-05-10 ENCOUNTER — APPOINTMENT (OUTPATIENT)
Dept: NEUROLOGY | Facility: CLINIC | Age: 84
End: 2024-05-10
Payer: MEDICARE

## 2024-05-10 VITALS — BODY MASS INDEX: 27.86 KG/M2 | WEIGHT: 199 LBS | HEIGHT: 71 IN

## 2024-05-10 VITALS — DIASTOLIC BLOOD PRESSURE: 61 MMHG | SYSTOLIC BLOOD PRESSURE: 101 MMHG | HEART RATE: 77 BPM

## 2024-05-10 DIAGNOSIS — S09.90XA UNSPECIFIED INJURY OF HEAD, INITIAL ENCOUNTER: ICD-10-CM

## 2024-05-10 DIAGNOSIS — G62.9 POLYNEUROPATHY, UNSPECIFIED: ICD-10-CM

## 2024-05-10 DIAGNOSIS — G56.02 CARPAL TUNNEL SYNDROME, LEFT UPPER LIMB: ICD-10-CM

## 2024-05-10 PROCEDURE — G2211 COMPLEX E/M VISIT ADD ON: CPT

## 2024-05-10 PROCEDURE — 99214 OFFICE O/P EST MOD 30 MIN: CPT

## 2024-05-10 NOTE — PHYSICAL EXAM
[Person] : oriented to person [Place] : oriented to place [Time] : oriented to time [Concentration Intact] : normal concentrating ability [Visual Intact] : visual attention was ~T not ~L decreased [Naming Objects] : no difficulty naming common objects [Repeating Phrases] : no difficulty repeating a phrase [Writing A Sentence] : no difficulty writing a sentence [Fluency] : fluency intact [Comprehension] : comprehension intact [Reading] : reading intact [Past History] : adequate knowledge of personal past history [Cranial Nerves Optic (II)] : visual acuity intact bilaterally,  visual fields full to confrontation, pupils equal round and reactive to light [Cranial Nerves Oculomotor (III)] : extraocular motion intact [Cranial Nerves Trigeminal (V)] : facial sensation intact symmetrically [Cranial Nerves Facial (VII)] : face symmetrical [Cranial Nerves Vestibulocochlear (VIII)] : hearing was intact bilaterally [Cranial Nerves Glossopharyngeal (IX)] : tongue and palate midline [Cranial Nerves Accessory (XI - Cranial And Spinal)] : head turning and shoulder shrug symmetric [Cranial Nerves Hypoglossal (XII)] : there was no tongue deviation with protrusion [Motor Strength] : muscle strength was normal in all four extremities [No Muscle Atrophy] : normal bulk in all four extremities [Past-pointing] : there was no past-pointing [Tremor] : no tremor present [0] : Ankle jerk left 0 [Plantar Reflex Right Only] : normal on the right [Plantar Reflex Left Only] : normal on the left [FreeTextEntry7] : Decreased vibration decreased vibration/temperature all the way to the knee level, positive Tinel's sign in the left wrist [FreeTextEntry8] : Positive Romberg, trouble with tandem, able to walk without assistance

## 2024-05-10 NOTE — ASSESSMENT
[FreeTextEntry1] : Recent head trauma-worsening unsteadiness - MRI of the brain without gadolinium  -Physical therapy for fall prevention and gait training after MRI clearance  Polyneuropathy/left carpal tunnel syndrome - EMG/NCS of upper and lower extremities

## 2024-05-10 NOTE — HISTORY OF PRESENT ILLNESS
[FreeTextEntry1] : Mr. FARIHA KHOURY returns to the office for follow-up and his prior history and physical have been reviewed and he reports no change since last visit.  Patient was seen last year for forgetfulness which was attributed to a vascular etiology.  Today he is brought in by his daughter because since he had a fall down 2 steps of stairs hitting his head, he has noticed intermittent vertigo as well as unsteady gait.  Incidentally getting better, he reports that his unsteady gait has worsened compared to 2 weeks ago.  He had a history of peripheral vestibular dysfunction in the past which responded to vestibular therapy.  He currently is doing therapy at [facility] and was told that he needed neurologic evaluation and a scan of the brain. Prior to head trauma, he was not noted to have unsteady gait.  However he does report intermittent numbness, paresthesia in his extremities.  He is known to be diabetic but says his sugar has been controlled well.

## 2024-05-17 ENCOUNTER — APPOINTMENT (OUTPATIENT)
Dept: MRI IMAGING | Facility: CLINIC | Age: 84
End: 2024-05-17
Payer: MEDICARE

## 2024-05-17 PROCEDURE — 70551 MRI BRAIN STEM W/O DYE: CPT

## 2024-05-22 ENCOUNTER — APPOINTMENT (OUTPATIENT)
Dept: NEUROLOGY | Facility: CLINIC | Age: 84
End: 2024-05-22
Payer: MEDICARE

## 2024-05-22 PROCEDURE — 95886 MUSC TEST DONE W/N TEST COMP: CPT

## 2024-05-22 PROCEDURE — 95911 NRV CNDJ TEST 9-10 STUDIES: CPT

## 2024-05-23 ENCOUNTER — APPOINTMENT (OUTPATIENT)
Dept: NEUROLOGY | Facility: CLINIC | Age: 84
End: 2024-05-23
Payer: MEDICARE

## 2024-05-23 PROCEDURE — 95886 MUSC TEST DONE W/N TEST COMP: CPT

## 2024-05-23 PROCEDURE — 95912 NRV CNDJ TEST 11-12 STUDIES: CPT

## 2024-05-29 ENCOUNTER — APPOINTMENT (OUTPATIENT)
Dept: VASCULAR SURGERY | Facility: CLINIC | Age: 84
End: 2024-05-29

## 2024-05-29 ENCOUNTER — APPOINTMENT (OUTPATIENT)
Dept: VASCULAR SURGERY | Facility: CLINIC | Age: 84
End: 2024-05-29
Payer: MEDICARE

## 2024-05-29 VITALS — SYSTOLIC BLOOD PRESSURE: 127 MMHG | DIASTOLIC BLOOD PRESSURE: 78 MMHG

## 2024-05-29 VITALS — HEIGHT: 71 IN | BODY MASS INDEX: 28 KG/M2 | WEIGHT: 200 LBS

## 2024-05-29 DIAGNOSIS — I48.91 UNSPECIFIED ATRIAL FIBRILLATION: ICD-10-CM

## 2024-05-29 DIAGNOSIS — I65.23 OCCLUSION AND STENOSIS OF BILATERAL CAROTID ARTERIES: ICD-10-CM

## 2024-05-29 PROCEDURE — 93880 EXTRACRANIAL BILAT STUDY: CPT

## 2024-05-29 PROCEDURE — 99203 OFFICE O/P NEW LOW 30 MIN: CPT

## 2024-05-29 RX ORDER — ISOSORBIDE MONONITRATE 30 MG/1
30 TABLET, EXTENDED RELEASE ORAL
Refills: 0 | Status: ACTIVE | COMMUNITY

## 2024-05-29 RX ORDER — SACUBITRIL AND VALSARTAN 49; 51 MG/1; MG/1
TABLET, FILM COATED ORAL
Refills: 0 | Status: ACTIVE | COMMUNITY

## 2024-05-29 RX ORDER — AMIODARONE HYDROCHLORIDE 200 MG/1
200 TABLET ORAL
Refills: 0 | Status: ACTIVE | COMMUNITY

## 2024-05-29 RX ORDER — APIXABAN 2.5 MG/1
2.5 TABLET, FILM COATED ORAL
Refills: 0 | Status: ACTIVE | COMMUNITY

## 2024-05-29 RX ORDER — SENNOSIDES 8.6 MG TABLETS 8.6 MG/1
8.6 TABLET ORAL
Refills: 0 | Status: ACTIVE | COMMUNITY

## 2024-05-29 RX ORDER — ROSUVASTATIN CALCIUM 10 MG/1
10 TABLET, FILM COATED ORAL
Refills: 0 | Status: ACTIVE | COMMUNITY

## 2024-05-29 RX ORDER — CLOPIDOGREL BISULFATE 75 MG/1
75 TABLET, FILM COATED ORAL
Refills: 0 | Status: ACTIVE | COMMUNITY

## 2024-05-29 RX ORDER — DILTIAZEM HYDROCHLORIDE 120 MG/1
120 CAPSULE, COATED, EXTENDED RELEASE ORAL
Refills: 0 | Status: ACTIVE | COMMUNITY

## 2024-05-29 RX ORDER — FAMOTIDINE 10 MG/1
TABLET, FILM COATED ORAL
Refills: 0 | Status: ACTIVE | COMMUNITY

## 2024-05-29 RX ORDER — PANTOPRAZOLE SODIUM 40 MG/10ML
40 INJECTION, POWDER, FOR SOLUTION INTRAVENOUS
Refills: 0 | Status: DISCONTINUED | COMMUNITY
End: 2024-05-29

## 2024-05-29 NOTE — DATA REVIEWED
[FreeTextEntry1] : I performed a carotid duplex which was medically necessary to evaluate for carotid stenosis. It showed 50-69% stenosis of bilateral carotid arteries.

## 2024-05-29 NOTE — CONSULT LETTER
[Dear  ___] : Dear  [unfilled], [Consult Letter:] : I had the pleasure of evaluating your patient, [unfilled]. [Please see my note below.] : Please see my note below. [FreeTextEntry2] : Dear Dr. Cy Holguin,

## 2024-05-29 NOTE — HISTORY OF PRESENT ILLNESS
[FreeTextEntry1] : 82 y/o M sent in by Cardiologist for carotid artery stenosis, no h/o CVA or TIA. Had a coronary angioplasty and stent about 6 weeks ago. C/o pain in the legs bilaterally, sometimes on ambulation, sometimes at rest, cannot walk more than half a block due to dyspnea.

## 2024-05-29 NOTE — ASSESSMENT
[FreeTextEntry1] : 82 y/o M with mild to moderate carotid artery stenosis, asymptomatic. No surgical intervention needed at this time.  C/o pain in the legs bilaterally, sometimes on ambulation, sometimes at rest. I will see him in one months' time and obtain an arterial duplex of the lower extremities at the time.  I spent 35 minutes with patient performing physical exam, reviewing duplex results, discussing treatment plan and follow up.   I, Dr. Arzate, personally performed the evaluation and management (E/M) services for this established patient who presents today with (a) new problem(s)/exacerbation of (an) existing condition(s). That E/M includes conducting the clinically appropriate interval history &/or exam, assessing all new/exacerbated conditions, and establishing a new plan of care. Today, my TALIB, Kelly], was here to observe my evaluation and management service for this new problem/exacerbated condition and follow the plan of care established by me going forward.  Thank you for allowing me to participate in the care of your patient.   Sincerely,   Gee Arzate MD, RPVI, FACS Associate Professor of Surgery , Vascular Fellowship Director of Limb Salvage Surgery [facility] [employer] at hospitals/[facility]

## 2024-05-29 NOTE — PHYSICAL EXAM
[2+] : left 2+ [Ankle Swelling (On Exam)] : present [Ankle Swelling Bilaterally] : bilaterally  [Varicose Veins Of Lower Extremities] : bilaterally [Ankle Swelling On The Right] : mild [Right Carotid Bruit] : no bruit heard over the right carotid [Left Carotid Bruit] : no bruit heard over the left carotid [] : not present

## 2024-06-29 ENCOUNTER — INPATIENT (INPATIENT)
Facility: HOSPITAL | Age: 84
LOS: 1 days | Discharge: HOME | DRG: 316 | End: 2024-07-01
Attending: STUDENT IN AN ORGANIZED HEALTH CARE EDUCATION/TRAINING PROGRAM | Admitting: STUDENT IN AN ORGANIZED HEALTH CARE EDUCATION/TRAINING PROGRAM
Payer: MEDICARE

## 2024-06-29 VITALS
OXYGEN SATURATION: 93 % | SYSTOLIC BLOOD PRESSURE: 113 MMHG | HEART RATE: 65 BPM | RESPIRATION RATE: 20 BRPM | TEMPERATURE: 98 F | DIASTOLIC BLOOD PRESSURE: 71 MMHG | WEIGHT: 197.98 LBS

## 2024-06-29 DIAGNOSIS — Z90.49 ACQUIRED ABSENCE OF OTHER SPECIFIED PARTS OF DIGESTIVE TRACT: Chronic | ICD-10-CM

## 2024-06-29 DIAGNOSIS — Z98.890 OTHER SPECIFIED POSTPROCEDURAL STATES: Chronic | ICD-10-CM

## 2024-06-29 DIAGNOSIS — Z96.0 PRESENCE OF UROGENITAL IMPLANTS: Chronic | ICD-10-CM

## 2024-06-29 DIAGNOSIS — Z98.61 CORONARY ANGIOPLASTY STATUS: Chronic | ICD-10-CM

## 2024-06-29 DIAGNOSIS — I95.9 HYPOTENSION, UNSPECIFIED: ICD-10-CM

## 2024-06-29 DIAGNOSIS — Z94.7 CORNEAL TRANSPLANT STATUS: Chronic | ICD-10-CM

## 2024-06-29 LAB
ALBUMIN SERPL ELPH-MCNC: 3.7 G/DL — SIGNIFICANT CHANGE UP (ref 3.5–5.2)
ALP SERPL-CCNC: 110 U/L — SIGNIFICANT CHANGE UP (ref 30–115)
ALT FLD-CCNC: 10 U/L — SIGNIFICANT CHANGE UP (ref 0–41)
ANION GAP SERPL CALC-SCNC: 9 MMOL/L — SIGNIFICANT CHANGE UP (ref 7–14)
AST SERPL-CCNC: 14 U/L — SIGNIFICANT CHANGE UP (ref 0–41)
BASOPHILS # BLD AUTO: 0.05 K/UL — SIGNIFICANT CHANGE UP (ref 0–0.2)
BASOPHILS NFR BLD AUTO: 1 % — SIGNIFICANT CHANGE UP (ref 0–1)
BILIRUB SERPL-MCNC: 0.2 MG/DL — SIGNIFICANT CHANGE UP (ref 0.2–1.2)
BUN SERPL-MCNC: 22 MG/DL — HIGH (ref 10–20)
CALCIUM SERPL-MCNC: 9.6 MG/DL — SIGNIFICANT CHANGE UP (ref 8.4–10.5)
CHLORIDE SERPL-SCNC: 106 MMOL/L — SIGNIFICANT CHANGE UP (ref 98–110)
CO2 SERPL-SCNC: 26 MMOL/L — SIGNIFICANT CHANGE UP (ref 17–32)
CREAT SERPL-MCNC: 1.7 MG/DL — HIGH (ref 0.7–1.5)
EGFR: 39 ML/MIN/1.73M2 — LOW
EOSINOPHIL # BLD AUTO: 0.37 K/UL — SIGNIFICANT CHANGE UP (ref 0–0.7)
EOSINOPHIL NFR BLD AUTO: 7.5 % — SIGNIFICANT CHANGE UP (ref 0–8)
GLUCOSE SERPL-MCNC: 96 MG/DL — SIGNIFICANT CHANGE UP (ref 70–99)
HCT VFR BLD CALC: 37.4 % — LOW (ref 42–52)
HGB BLD-MCNC: 11.8 G/DL — LOW (ref 14–18)
IMM GRANULOCYTES NFR BLD AUTO: 0.2 % — SIGNIFICANT CHANGE UP (ref 0.1–0.3)
LYMPHOCYTES # BLD AUTO: 1.17 K/UL — LOW (ref 1.2–3.4)
LYMPHOCYTES # BLD AUTO: 23.8 % — SIGNIFICANT CHANGE UP (ref 20.5–51.1)
MCHC RBC-ENTMCNC: 31.1 PG — HIGH (ref 27–31)
MCHC RBC-ENTMCNC: 31.6 G/DL — LOW (ref 32–37)
MCV RBC AUTO: 98.4 FL — HIGH (ref 80–94)
MONOCYTES # BLD AUTO: 0.35 K/UL — SIGNIFICANT CHANGE UP (ref 0.1–0.6)
MONOCYTES NFR BLD AUTO: 7.1 % — SIGNIFICANT CHANGE UP (ref 1.7–9.3)
NEUTROPHILS # BLD AUTO: 2.96 K/UL — SIGNIFICANT CHANGE UP (ref 1.4–6.5)
NEUTROPHILS NFR BLD AUTO: 60.4 % — SIGNIFICANT CHANGE UP (ref 42.2–75.2)
NRBC # BLD: 0 /100 WBCS — SIGNIFICANT CHANGE UP (ref 0–0)
NT-PROBNP SERPL-SCNC: 363 PG/ML — HIGH (ref 0–300)
PLATELET # BLD AUTO: 204 K/UL — SIGNIFICANT CHANGE UP (ref 130–400)
PMV BLD: 10 FL — SIGNIFICANT CHANGE UP (ref 7.4–10.4)
POTASSIUM SERPL-MCNC: 4.5 MMOL/L — SIGNIFICANT CHANGE UP (ref 3.5–5)
POTASSIUM SERPL-SCNC: 4.5 MMOL/L — SIGNIFICANT CHANGE UP (ref 3.5–5)
PROT SERPL-MCNC: 6.2 G/DL — SIGNIFICANT CHANGE UP (ref 6–8)
RBC # BLD: 3.8 M/UL — LOW (ref 4.7–6.1)
RBC # FLD: 14.6 % — HIGH (ref 11.5–14.5)
SODIUM SERPL-SCNC: 141 MMOL/L — SIGNIFICANT CHANGE UP (ref 135–146)
TROPONIN T, HIGH SENSITIVITY RESULT: 46 NG/L — HIGH (ref 6–21)
TROPONIN T, HIGH SENSITIVITY RESULT: 48 NG/L — HIGH (ref 6–21)
WBC # BLD: 4.91 K/UL — SIGNIFICANT CHANGE UP (ref 4.8–10.8)
WBC # FLD AUTO: 4.91 K/UL — SIGNIFICANT CHANGE UP (ref 4.8–10.8)

## 2024-06-29 PROCEDURE — 80048 BASIC METABOLIC PNL TOTAL CA: CPT

## 2024-06-29 PROCEDURE — 93005 ELECTROCARDIOGRAM TRACING: CPT

## 2024-06-29 PROCEDURE — 99285 EMERGENCY DEPT VISIT HI MDM: CPT

## 2024-06-29 PROCEDURE — 36415 COLL VENOUS BLD VENIPUNCTURE: CPT

## 2024-06-29 PROCEDURE — 82962 GLUCOSE BLOOD TEST: CPT

## 2024-06-29 PROCEDURE — 82746 ASSAY OF FOLIC ACID SERUM: CPT

## 2024-06-29 PROCEDURE — 97161 PT EVAL LOW COMPLEX 20 MIN: CPT | Mod: GP

## 2024-06-29 PROCEDURE — 87040 BLOOD CULTURE FOR BACTERIA: CPT

## 2024-06-29 PROCEDURE — 82607 VITAMIN B-12: CPT

## 2024-06-29 PROCEDURE — 71045 X-RAY EXAM CHEST 1 VIEW: CPT | Mod: 26

## 2024-06-29 PROCEDURE — 94640 AIRWAY INHALATION TREATMENT: CPT

## 2024-06-29 PROCEDURE — 85025 COMPLETE CBC W/AUTO DIFF WBC: CPT

## 2024-06-29 RX ORDER — ACETAMINOPHEN 325 MG
650 TABLET ORAL EVERY 6 HOURS
Refills: 0 | Status: DISCONTINUED | OUTPATIENT
Start: 2024-06-29 | End: 2024-07-01

## 2024-06-29 RX ORDER — SIMVASTATIN 40 MG
1 TABLET ORAL
Refills: 0 | DISCHARGE

## 2024-06-29 RX ORDER — SENNOSIDES 8.6 MG
1 TABLET ORAL
Refills: 0 | DISCHARGE

## 2024-06-29 RX ORDER — DILTIAZEM HYDROCHLORIDE 240 MG/1
120 CAPSULE, EXTENDED RELEASE ORAL DAILY
Refills: 0 | Status: DISCONTINUED | OUTPATIENT
Start: 2024-06-29 | End: 2024-06-29

## 2024-06-29 RX ORDER — BUMETANIDE 0.25 MG/ML
1 INJECTION INTRAMUSCULAR; INTRAVENOUS
Refills: 0 | DISCHARGE

## 2024-06-29 RX ORDER — TAMSULOSIN HYDROCHLORIDE 0.4 MG/1
0.8 CAPSULE ORAL AT BEDTIME
Refills: 0 | Status: DISCONTINUED | OUTPATIENT
Start: 2024-06-29 | End: 2024-07-01

## 2024-06-29 RX ORDER — CLOPIDOGREL BISULFATE 75 MG/1
1 TABLET, FILM COATED ORAL
Refills: 0 | DISCHARGE

## 2024-06-29 RX ORDER — SODIUM CHLORIDE 0.9 % (FLUSH) 0.9 %
500 SYRINGE (ML) INJECTION ONCE
Refills: 0 | Status: COMPLETED | OUTPATIENT
Start: 2024-06-29 | End: 2024-06-29

## 2024-06-29 RX ORDER — FAMOTIDINE 40 MG
20 TABLET ORAL DAILY
Refills: 0 | Status: DISCONTINUED | OUTPATIENT
Start: 2024-06-29 | End: 2024-07-01

## 2024-06-29 RX ORDER — BUDESONIDE/FORMOTEROL FUMARATE 160-4.5MCG
2 HFA AEROSOL WITH ADAPTER (GRAM) INHALATION
Refills: 0 | Status: DISCONTINUED | OUTPATIENT
Start: 2024-06-29 | End: 2024-07-01

## 2024-06-29 RX ORDER — SACUBITRIL AND VALSARTAN 24; 26 MG/1; MG/1
1 TABLET, FILM COATED ORAL
Qty: 0 | Refills: 0 | DISCHARGE

## 2024-06-29 RX ORDER — AMIODARONE HYDROCHLORIDE 50 MG/ML
200 INJECTION, SOLUTION INTRAVENOUS DAILY
Refills: 0 | Status: DISCONTINUED | OUTPATIENT
Start: 2024-06-29 | End: 2024-07-01

## 2024-06-29 RX ORDER — DILTIAZEM HYDROCHLORIDE 240 MG/1
120 CAPSULE, EXTENDED RELEASE ORAL DAILY
Refills: 0 | Status: DISCONTINUED | OUTPATIENT
Start: 2024-06-29 | End: 2024-07-01

## 2024-06-29 RX ORDER — MAGNESIUM, ALUMINUM HYDROXIDE 400-400
30 TABLET,CHEWABLE ORAL EVERY 4 HOURS
Refills: 0 | Status: DISCONTINUED | OUTPATIENT
Start: 2024-06-29 | End: 2024-07-01

## 2024-06-29 RX ORDER — CLOPIDOGREL BISULFATE 75 MG/1
75 TABLET, FILM COATED ORAL DAILY
Refills: 0 | Status: DISCONTINUED | OUTPATIENT
Start: 2024-06-29 | End: 2024-07-01

## 2024-06-29 RX ORDER — ONDANSETRON HYDROCHLORIDE 2 MG/ML
4 INJECTION INTRAMUSCULAR; INTRAVENOUS EVERY 8 HOURS
Refills: 0 | Status: DISCONTINUED | OUTPATIENT
Start: 2024-06-29 | End: 2024-07-01

## 2024-06-29 RX ORDER — ALBUTEROL 90 UG/1
0.5 AEROSOL, METERED ORAL
Qty: 0 | Refills: 0 | DISCHARGE

## 2024-06-29 RX ORDER — ALBUTEROL 90 MCG
2.5 AEROSOL REFILL (GRAM) INHALATION
Refills: 0 | Status: DISCONTINUED | OUTPATIENT
Start: 2024-06-29 | End: 2024-06-29

## 2024-06-29 RX ORDER — DILTIAZEM HYDROCHLORIDE 240 MG/1
1 CAPSULE, EXTENDED RELEASE ORAL
Refills: 0 | DISCHARGE

## 2024-06-29 RX ORDER — FAMOTIDINE 40 MG
1 TABLET ORAL
Refills: 0 | DISCHARGE

## 2024-06-29 RX ORDER — SIMVASTATIN 40 MG
10 TABLET ORAL AT BEDTIME
Refills: 0 | Status: DISCONTINUED | OUTPATIENT
Start: 2024-06-29 | End: 2024-07-01

## 2024-06-29 RX ORDER — APIXABAN 5 MG/1
2.5 TABLET, FILM COATED ORAL EVERY 12 HOURS
Refills: 0 | Status: DISCONTINUED | OUTPATIENT
Start: 2024-06-29 | End: 2024-07-01

## 2024-06-29 RX ORDER — SENNOSIDES 8.6 MG
1 TABLET ORAL AT BEDTIME
Refills: 0 | Status: DISCONTINUED | OUTPATIENT
Start: 2024-06-29 | End: 2024-07-01

## 2024-06-29 RX ORDER — BUDESONIDE AND FORMOTEROL FUMARATE DIHYDRATE 160; 4.5 UG/1; UG/1
2 AEROSOL RESPIRATORY (INHALATION)
Qty: 0 | Refills: 0 | DISCHARGE

## 2024-06-29 RX ORDER — APIXABAN 2.5 MG/1
1 TABLET, FILM COATED ORAL
Qty: 0 | Refills: 0 | DISCHARGE

## 2024-06-29 RX ADMIN — TAMSULOSIN HYDROCHLORIDE 0.8 MILLIGRAM(S): 0.4 CAPSULE ORAL at 22:09

## 2024-06-29 RX ADMIN — Medication 1 TABLET(S): at 22:09

## 2024-06-29 RX ADMIN — Medication 500 MILLILITER(S): at 15:12

## 2024-06-29 RX ADMIN — Medication 10 MILLIGRAM(S): at 22:10

## 2024-06-30 LAB
ANION GAP SERPL CALC-SCNC: 10 MMOL/L — SIGNIFICANT CHANGE UP (ref 7–14)
BASOPHILS # BLD AUTO: 0.04 K/UL — SIGNIFICANT CHANGE UP (ref 0–0.2)
BASOPHILS # BLD AUTO: 0.04 K/UL — SIGNIFICANT CHANGE UP (ref 0–0.2)
BASOPHILS NFR BLD AUTO: 0.9 % — SIGNIFICANT CHANGE UP (ref 0–1)
BASOPHILS NFR BLD AUTO: 0.9 % — SIGNIFICANT CHANGE UP (ref 0–1)
BUN SERPL-MCNC: 21 MG/DL — HIGH (ref 10–20)
CALCIUM SERPL-MCNC: 9.5 MG/DL — SIGNIFICANT CHANGE UP (ref 8.4–10.5)
CHLORIDE SERPL-SCNC: 107 MMOL/L — SIGNIFICANT CHANGE UP (ref 98–110)
CO2 SERPL-SCNC: 24 MMOL/L — SIGNIFICANT CHANGE UP (ref 17–32)
CREAT SERPL-MCNC: 1.5 MG/DL — SIGNIFICANT CHANGE UP (ref 0.7–1.5)
EGFR: 46 ML/MIN/1.73M2 — LOW
EOSINOPHIL # BLD AUTO: 0.42 K/UL — SIGNIFICANT CHANGE UP (ref 0–0.7)
EOSINOPHIL # BLD AUTO: 0.42 K/UL — SIGNIFICANT CHANGE UP (ref 0–0.7)
EOSINOPHIL NFR BLD AUTO: 9.2 % — HIGH (ref 0–8)
EOSINOPHIL NFR BLD AUTO: 9.4 % — HIGH (ref 0–8)
GLUCOSE BLDC GLUCOMTR-MCNC: 99 MG/DL — SIGNIFICANT CHANGE UP (ref 70–99)
GLUCOSE BLDC GLUCOMTR-MCNC: 99 MG/DL — SIGNIFICANT CHANGE UP (ref 70–99)
GLUCOSE SERPL-MCNC: 97 MG/DL — SIGNIFICANT CHANGE UP (ref 70–99)
HCT VFR BLD CALC: 37 % — LOW (ref 42–52)
HCT VFR BLD CALC: 37.4 % — LOW (ref 42–52)
HGB BLD-MCNC: 11.8 G/DL — LOW (ref 14–18)
HGB BLD-MCNC: 12 G/DL — LOW (ref 14–18)
IMM GRANULOCYTES NFR BLD AUTO: 0.2 % — SIGNIFICANT CHANGE UP (ref 0.1–0.3)
IMM GRANULOCYTES NFR BLD AUTO: 0.2 % — SIGNIFICANT CHANGE UP (ref 0.1–0.3)
LYMPHOCYTES # BLD AUTO: 1.35 K/UL — SIGNIFICANT CHANGE UP (ref 1.2–3.4)
LYMPHOCYTES # BLD AUTO: 1.41 K/UL — SIGNIFICANT CHANGE UP (ref 1.2–3.4)
LYMPHOCYTES # BLD AUTO: 29.5 % — SIGNIFICANT CHANGE UP (ref 20.5–51.1)
LYMPHOCYTES # BLD AUTO: 31.4 % — SIGNIFICANT CHANGE UP (ref 20.5–51.1)
MCHC RBC-ENTMCNC: 31.2 PG — HIGH (ref 27–31)
MCHC RBC-ENTMCNC: 31.2 PG — HIGH (ref 27–31)
MCHC RBC-ENTMCNC: 31.9 G/DL — LOW (ref 32–37)
MCHC RBC-ENTMCNC: 32.1 G/DL — SIGNIFICANT CHANGE UP (ref 32–37)
MCV RBC AUTO: 97.1 FL — HIGH (ref 80–94)
MCV RBC AUTO: 97.9 FL — HIGH (ref 80–94)
MONOCYTES # BLD AUTO: 0.36 K/UL — SIGNIFICANT CHANGE UP (ref 0.1–0.6)
MONOCYTES # BLD AUTO: 0.4 K/UL — SIGNIFICANT CHANGE UP (ref 0.1–0.6)
MONOCYTES NFR BLD AUTO: 7.9 % — SIGNIFICANT CHANGE UP (ref 1.7–9.3)
MONOCYTES NFR BLD AUTO: 8.9 % — SIGNIFICANT CHANGE UP (ref 1.7–9.3)
NEUTROPHILS # BLD AUTO: 2.21 K/UL — SIGNIFICANT CHANGE UP (ref 1.4–6.5)
NEUTROPHILS # BLD AUTO: 2.4 K/UL — SIGNIFICANT CHANGE UP (ref 1.4–6.5)
NEUTROPHILS NFR BLD AUTO: 49.2 % — SIGNIFICANT CHANGE UP (ref 42.2–75.2)
NEUTROPHILS NFR BLD AUTO: 52.3 % — SIGNIFICANT CHANGE UP (ref 42.2–75.2)
NRBC # BLD: 0 /100 WBCS — SIGNIFICANT CHANGE UP (ref 0–0)
NRBC # BLD: 0 /100 WBCS — SIGNIFICANT CHANGE UP (ref 0–0)
PLATELET # BLD AUTO: 185 K/UL — SIGNIFICANT CHANGE UP (ref 130–400)
PLATELET # BLD AUTO: 185 K/UL — SIGNIFICANT CHANGE UP (ref 130–400)
PMV BLD: 10.2 FL — SIGNIFICANT CHANGE UP (ref 7.4–10.4)
PMV BLD: 10.3 FL — SIGNIFICANT CHANGE UP (ref 7.4–10.4)
POTASSIUM SERPL-MCNC: 4 MMOL/L — SIGNIFICANT CHANGE UP (ref 3.5–5)
POTASSIUM SERPL-SCNC: 4 MMOL/L — SIGNIFICANT CHANGE UP (ref 3.5–5)
RBC # BLD: 3.78 M/UL — LOW (ref 4.7–6.1)
RBC # BLD: 3.85 M/UL — LOW (ref 4.7–6.1)
RBC # FLD: 14.6 % — HIGH (ref 11.5–14.5)
RBC # FLD: 14.7 % — HIGH (ref 11.5–14.5)
SODIUM SERPL-SCNC: 141 MMOL/L — SIGNIFICANT CHANGE UP (ref 135–146)
WBC # BLD: 4.49 K/UL — LOW (ref 4.8–10.8)
WBC # BLD: 4.58 K/UL — LOW (ref 4.8–10.8)
WBC # FLD AUTO: 4.49 K/UL — LOW (ref 4.8–10.8)
WBC # FLD AUTO: 4.58 K/UL — LOW (ref 4.8–10.8)

## 2024-06-30 PROCEDURE — 93010 ELECTROCARDIOGRAM REPORT: CPT

## 2024-06-30 PROCEDURE — 99223 1ST HOSP IP/OBS HIGH 75: CPT

## 2024-06-30 RX ADMIN — CLOPIDOGREL BISULFATE 75 MILLIGRAM(S): 75 TABLET, FILM COATED ORAL at 12:17

## 2024-06-30 RX ADMIN — DILTIAZEM HYDROCHLORIDE 120 MILLIGRAM(S): 240 CAPSULE, EXTENDED RELEASE ORAL at 05:57

## 2024-06-30 RX ADMIN — Medication 10 MILLIGRAM(S): at 21:20

## 2024-06-30 RX ADMIN — APIXABAN 2.5 MILLIGRAM(S): 5 TABLET, FILM COATED ORAL at 05:57

## 2024-06-30 RX ADMIN — Medication 2 PUFF(S): at 21:21

## 2024-06-30 RX ADMIN — TAMSULOSIN HYDROCHLORIDE 0.8 MILLIGRAM(S): 0.4 CAPSULE ORAL at 21:20

## 2024-06-30 RX ADMIN — Medication 20 MILLIGRAM(S): at 12:17

## 2024-06-30 RX ADMIN — AMIODARONE HYDROCHLORIDE 200 MILLIGRAM(S): 50 INJECTION, SOLUTION INTRAVENOUS at 05:58

## 2024-06-30 RX ADMIN — APIXABAN 2.5 MILLIGRAM(S): 5 TABLET, FILM COATED ORAL at 17:37

## 2024-07-01 ENCOUNTER — TRANSCRIPTION ENCOUNTER (OUTPATIENT)
Age: 84
End: 2024-07-01

## 2024-07-01 ENCOUNTER — APPOINTMENT (OUTPATIENT)
Dept: NEUROLOGY | Facility: CLINIC | Age: 84
End: 2024-07-01

## 2024-07-01 VITALS — SYSTOLIC BLOOD PRESSURE: 166 MMHG | DIASTOLIC BLOOD PRESSURE: 80 MMHG | HEART RATE: 79 BPM

## 2024-07-01 LAB
FOLATE SERPL-MCNC: 10.4 NG/ML — SIGNIFICANT CHANGE UP
GLUCOSE BLDC GLUCOMTR-MCNC: 120 MG/DL — HIGH (ref 70–99)
GLUCOSE BLDC GLUCOMTR-MCNC: 149 MG/DL — HIGH (ref 70–99)
VIT B12 SERPL-MCNC: 1904 PG/ML — HIGH (ref 232–1245)

## 2024-07-01 PROCEDURE — 99239 HOSP IP/OBS DSCHRG MGMT >30: CPT

## 2024-07-01 RX ORDER — EMPAGLIFLOZIN 10 MG/1
1 TABLET, FILM COATED ORAL
Qty: 0 | Refills: 0 | DISCHARGE

## 2024-07-01 RX ORDER — ISOSORBIDE DINITRATE 5 MG/1
1 TABLET ORAL
Refills: 0 | DISCHARGE

## 2024-07-01 RX ORDER — SACUBITRIL AND VALSARTAN 97; 103 MG/1; MG/1
1 TABLET, FILM COATED ORAL
Refills: 0 | Status: DISCONTINUED | OUTPATIENT
Start: 2024-07-01 | End: 2024-07-01

## 2024-07-01 RX ADMIN — Medication 20 MILLIGRAM(S): at 11:22

## 2024-07-01 RX ADMIN — CLOPIDOGREL BISULFATE 75 MILLIGRAM(S): 75 TABLET, FILM COATED ORAL at 11:22

## 2024-07-01 RX ADMIN — APIXABAN 2.5 MILLIGRAM(S): 5 TABLET, FILM COATED ORAL at 05:39

## 2024-07-01 RX ADMIN — AMIODARONE HYDROCHLORIDE 200 MILLIGRAM(S): 50 INJECTION, SOLUTION INTRAVENOUS at 05:40

## 2024-07-01 RX ADMIN — DILTIAZEM HYDROCHLORIDE 120 MILLIGRAM(S): 240 CAPSULE, EXTENDED RELEASE ORAL at 05:39

## 2024-07-01 RX ADMIN — SACUBITRIL AND VALSARTAN 1 TABLET(S): 97; 103 TABLET, FILM COATED ORAL at 11:22

## 2024-07-06 LAB
CULTURE RESULTS: SIGNIFICANT CHANGE UP
SPECIMEN SOURCE: SIGNIFICANT CHANGE UP

## 2024-07-10 DIAGNOSIS — Z94.7 CORNEAL TRANSPLANT STATUS: ICD-10-CM

## 2024-07-10 DIAGNOSIS — I13.0 HYPERTENSIVE HEART AND CHRONIC KIDNEY DISEASE WITH HEART FAILURE AND STAGE 1 THROUGH STAGE 4 CHRONIC KIDNEY DISEASE, OR UNSPECIFIED CHRONIC KIDNEY DISEASE: ICD-10-CM

## 2024-07-10 DIAGNOSIS — Z87.891 PERSONAL HISTORY OF NICOTINE DEPENDENCE: ICD-10-CM

## 2024-07-10 DIAGNOSIS — E78.5 HYPERLIPIDEMIA, UNSPECIFIED: ICD-10-CM

## 2024-07-10 DIAGNOSIS — K86.2 CYST OF PANCREAS: ICD-10-CM

## 2024-07-10 DIAGNOSIS — K21.9 GASTRO-ESOPHAGEAL REFLUX DISEASE WITHOUT ESOPHAGITIS: ICD-10-CM

## 2024-07-10 DIAGNOSIS — Z86.16 PERSONAL HISTORY OF COVID-19: ICD-10-CM

## 2024-07-10 DIAGNOSIS — N18.32 CHRONIC KIDNEY DISEASE, STAGE 3B: ICD-10-CM

## 2024-07-10 DIAGNOSIS — I95.9 HYPOTENSION, UNSPECIFIED: ICD-10-CM

## 2024-07-10 DIAGNOSIS — I50.32 CHRONIC DIASTOLIC (CONGESTIVE) HEART FAILURE: ICD-10-CM

## 2024-07-10 DIAGNOSIS — N40.0 BENIGN PROSTATIC HYPERPLASIA WITHOUT LOWER URINARY TRACT SYMPTOMS: ICD-10-CM

## 2024-07-10 DIAGNOSIS — Z90.49 ACQUIRED ABSENCE OF OTHER SPECIFIED PARTS OF DIGESTIVE TRACT: ICD-10-CM

## 2024-07-10 DIAGNOSIS — E11.22 TYPE 2 DIABETES MELLITUS WITH DIABETIC CHRONIC KIDNEY DISEASE: ICD-10-CM

## 2024-07-10 DIAGNOSIS — G47.33 OBSTRUCTIVE SLEEP APNEA (ADULT) (PEDIATRIC): ICD-10-CM

## 2024-07-10 DIAGNOSIS — Z95.5 PRESENCE OF CORONARY ANGIOPLASTY IMPLANT AND GRAFT: ICD-10-CM

## 2024-07-10 DIAGNOSIS — I48.20 CHRONIC ATRIAL FIBRILLATION, UNSPECIFIED: ICD-10-CM

## 2024-07-10 DIAGNOSIS — Z79.01 LONG TERM (CURRENT) USE OF ANTICOAGULANTS: ICD-10-CM

## 2024-07-10 DIAGNOSIS — J38.2 NODULES OF VOCAL CORDS: ICD-10-CM

## 2024-07-10 DIAGNOSIS — J44.9 CHRONIC OBSTRUCTIVE PULMONARY DISEASE, UNSPECIFIED: ICD-10-CM

## 2024-07-10 DIAGNOSIS — I25.10 ATHEROSCLEROTIC HEART DISEASE OF NATIVE CORONARY ARTERY WITHOUT ANGINA PECTORIS: ICD-10-CM

## 2024-07-10 DIAGNOSIS — R42 DIZZINESS AND GIDDINESS: ICD-10-CM

## 2024-07-12 ENCOUNTER — APPOINTMENT (OUTPATIENT)
Dept: NEUROLOGY | Facility: CLINIC | Age: 84
End: 2024-07-12
Payer: MEDICARE

## 2024-07-12 VITALS — DIASTOLIC BLOOD PRESSURE: 64 MMHG | SYSTOLIC BLOOD PRESSURE: 141 MMHG | HEART RATE: 81 BPM

## 2024-07-12 VITALS — WEIGHT: 200 LBS | HEIGHT: 67 IN | BODY MASS INDEX: 31.39 KG/M2

## 2024-07-12 DIAGNOSIS — S09.90XA UNSPECIFIED INJURY OF HEAD, INITIAL ENCOUNTER: ICD-10-CM

## 2024-07-12 DIAGNOSIS — G62.9 POLYNEUROPATHY, UNSPECIFIED: ICD-10-CM

## 2024-07-12 DIAGNOSIS — E11.9 TYPE 2 DIABETES MELLITUS W/OUT COMPLICATIONS: ICD-10-CM

## 2024-07-12 DIAGNOSIS — I73.9 PERIPHERAL VASCULAR DISEASE, UNSPECIFIED: ICD-10-CM

## 2024-07-12 PROCEDURE — 99214 OFFICE O/P EST MOD 30 MIN: CPT

## 2024-11-12 ENCOUNTER — APPOINTMENT (OUTPATIENT)
Dept: NEUROLOGY | Facility: CLINIC | Age: 84
End: 2024-11-12

## 2025-01-30 ENCOUNTER — APPOINTMENT (OUTPATIENT)
Dept: NEUROLOGY | Facility: CLINIC | Age: 85
End: 2025-01-30

## 2025-02-24 ENCOUNTER — EMERGENCY (EMERGENCY)
Facility: HOSPITAL | Age: 85
LOS: 0 days | Discharge: HOME | End: 2025-02-24
Attending: EMERGENCY MEDICINE
Payer: MEDICARE

## 2025-02-24 VITALS
DIASTOLIC BLOOD PRESSURE: 71 MMHG | RESPIRATION RATE: 18 BRPM | OXYGEN SATURATION: 99 % | SYSTOLIC BLOOD PRESSURE: 122 MMHG | HEART RATE: 84 BPM

## 2025-02-24 VITALS
RESPIRATION RATE: 18 BRPM | OXYGEN SATURATION: 95 % | SYSTOLIC BLOOD PRESSURE: 169 MMHG | HEIGHT: 67 IN | WEIGHT: 203.05 LBS | HEART RATE: 89 BPM | DIASTOLIC BLOOD PRESSURE: 67 MMHG | TEMPERATURE: 98 F

## 2025-02-24 DIAGNOSIS — K62.5 HEMORRHAGE OF ANUS AND RECTUM: ICD-10-CM

## 2025-02-24 DIAGNOSIS — Z94.7 CORNEAL TRANSPLANT STATUS: Chronic | ICD-10-CM

## 2025-02-24 DIAGNOSIS — Z98.890 OTHER SPECIFIED POSTPROCEDURAL STATES: Chronic | ICD-10-CM

## 2025-02-24 DIAGNOSIS — E11.22 TYPE 2 DIABETES MELLITUS WITH DIABETIC CHRONIC KIDNEY DISEASE: ICD-10-CM

## 2025-02-24 DIAGNOSIS — Z79.01 LONG TERM (CURRENT) USE OF ANTICOAGULANTS: ICD-10-CM

## 2025-02-24 DIAGNOSIS — R10.32 LEFT LOWER QUADRANT PAIN: ICD-10-CM

## 2025-02-24 DIAGNOSIS — N18.9 CHRONIC KIDNEY DISEASE, UNSPECIFIED: ICD-10-CM

## 2025-02-24 DIAGNOSIS — I50.9 HEART FAILURE, UNSPECIFIED: ICD-10-CM

## 2025-02-24 DIAGNOSIS — Z90.49 ACQUIRED ABSENCE OF OTHER SPECIFIED PARTS OF DIGESTIVE TRACT: Chronic | ICD-10-CM

## 2025-02-24 DIAGNOSIS — I25.10 ATHEROSCLEROTIC HEART DISEASE OF NATIVE CORONARY ARTERY WITHOUT ANGINA PECTORIS: ICD-10-CM

## 2025-02-24 DIAGNOSIS — E78.5 HYPERLIPIDEMIA, UNSPECIFIED: ICD-10-CM

## 2025-02-24 DIAGNOSIS — I48.91 UNSPECIFIED ATRIAL FIBRILLATION: ICD-10-CM

## 2025-02-24 DIAGNOSIS — G47.33 OBSTRUCTIVE SLEEP APNEA (ADULT) (PEDIATRIC): ICD-10-CM

## 2025-02-24 DIAGNOSIS — J45.909 UNSPECIFIED ASTHMA, UNCOMPLICATED: ICD-10-CM

## 2025-02-24 DIAGNOSIS — Z96.0 PRESENCE OF UROGENITAL IMPLANTS: Chronic | ICD-10-CM

## 2025-02-24 DIAGNOSIS — Z98.61 CORONARY ANGIOPLASTY STATUS: Chronic | ICD-10-CM

## 2025-02-24 LAB
ALBUMIN SERPL ELPH-MCNC: 3.5 G/DL — SIGNIFICANT CHANGE UP (ref 3.5–5.2)
ALP SERPL-CCNC: 75 U/L — SIGNIFICANT CHANGE UP (ref 30–115)
ALT FLD-CCNC: 14 U/L — SIGNIFICANT CHANGE UP (ref 0–41)
ANION GAP SERPL CALC-SCNC: 7 MMOL/L — SIGNIFICANT CHANGE UP (ref 7–14)
AST SERPL-CCNC: 12 U/L — SIGNIFICANT CHANGE UP (ref 0–41)
BASOPHILS # BLD AUTO: 0.02 K/UL — SIGNIFICANT CHANGE UP (ref 0–0.2)
BASOPHILS NFR BLD AUTO: 0.3 % — SIGNIFICANT CHANGE UP (ref 0–1)
BILIRUB SERPL-MCNC: <0.2 MG/DL — SIGNIFICANT CHANGE UP (ref 0.2–1.2)
BLD GP AB SCN SERPL QL: SIGNIFICANT CHANGE UP
BUN SERPL-MCNC: 29 MG/DL — HIGH (ref 10–20)
CALCIUM SERPL-MCNC: 9.8 MG/DL — SIGNIFICANT CHANGE UP (ref 8.4–10.5)
CHLORIDE SERPL-SCNC: 110 MMOL/L — SIGNIFICANT CHANGE UP (ref 98–110)
CO2 SERPL-SCNC: 24 MMOL/L — SIGNIFICANT CHANGE UP (ref 17–32)
CREAT SERPL-MCNC: 1.8 MG/DL — HIGH (ref 0.7–1.5)
EGFR: 36 ML/MIN/1.73M2 — LOW
EOSINOPHIL # BLD AUTO: 0.03 K/UL — SIGNIFICANT CHANGE UP (ref 0–0.7)
EOSINOPHIL NFR BLD AUTO: 0.4 % — SIGNIFICANT CHANGE UP (ref 0–8)
GLUCOSE SERPL-MCNC: 204 MG/DL — HIGH (ref 70–99)
HCT VFR BLD CALC: 32 % — LOW (ref 42–52)
HGB BLD-MCNC: 9.6 G/DL — LOW (ref 14–18)
IMM GRANULOCYTES NFR BLD AUTO: 0.4 % — HIGH (ref 0.1–0.3)
LIDOCAIN IGE QN: 13 U/L — SIGNIFICANT CHANGE UP (ref 7–60)
LYMPHOCYTES # BLD AUTO: 0.84 K/UL — LOW (ref 1.2–3.4)
LYMPHOCYTES # BLD AUTO: 10.7 % — LOW (ref 20.5–51.1)
MCHC RBC-ENTMCNC: 29.2 PG — SIGNIFICANT CHANGE UP (ref 27–31)
MCHC RBC-ENTMCNC: 30 G/DL — LOW (ref 32–37)
MCV RBC AUTO: 97.3 FL — HIGH (ref 80–94)
MONOCYTES # BLD AUTO: 0.59 K/UL — SIGNIFICANT CHANGE UP (ref 0.1–0.6)
MONOCYTES NFR BLD AUTO: 7.5 % — SIGNIFICANT CHANGE UP (ref 1.7–9.3)
NEUTROPHILS # BLD AUTO: 6.34 K/UL — SIGNIFICANT CHANGE UP (ref 1.4–6.5)
NEUTROPHILS NFR BLD AUTO: 80.7 % — HIGH (ref 42.2–75.2)
NRBC BLD AUTO-RTO: 0 /100 WBCS — SIGNIFICANT CHANGE UP (ref 0–0)
PLATELET # BLD AUTO: 344 K/UL — SIGNIFICANT CHANGE UP (ref 130–400)
PMV BLD: 10.1 FL — SIGNIFICANT CHANGE UP (ref 7.4–10.4)
POTASSIUM SERPL-MCNC: 4.8 MMOL/L — SIGNIFICANT CHANGE UP (ref 3.5–5)
POTASSIUM SERPL-SCNC: 4.8 MMOL/L — SIGNIFICANT CHANGE UP (ref 3.5–5)
PROT SERPL-MCNC: 5.8 G/DL — LOW (ref 6–8)
RBC # BLD: 3.29 M/UL — LOW (ref 4.7–6.1)
RBC # FLD: 15.8 % — HIGH (ref 11.5–14.5)
SODIUM SERPL-SCNC: 141 MMOL/L — SIGNIFICANT CHANGE UP (ref 135–146)
WBC # BLD: 7.85 K/UL — SIGNIFICANT CHANGE UP (ref 4.8–10.8)
WBC # FLD AUTO: 7.85 K/UL — SIGNIFICANT CHANGE UP (ref 4.8–10.8)

## 2025-02-24 PROCEDURE — 86850 RBC ANTIBODY SCREEN: CPT

## 2025-02-24 PROCEDURE — 74177 CT ABD & PELVIS W/CONTRAST: CPT | Mod: MC

## 2025-02-24 PROCEDURE — 86900 BLOOD TYPING SEROLOGIC ABO: CPT

## 2025-02-24 PROCEDURE — 86901 BLOOD TYPING SEROLOGIC RH(D): CPT

## 2025-02-24 PROCEDURE — 99285 EMERGENCY DEPT VISIT HI MDM: CPT | Mod: 25

## 2025-02-24 PROCEDURE — 36415 COLL VENOUS BLD VENIPUNCTURE: CPT

## 2025-02-24 PROCEDURE — 74177 CT ABD & PELVIS W/CONTRAST: CPT | Mod: 26

## 2025-02-24 PROCEDURE — 83690 ASSAY OF LIPASE: CPT

## 2025-02-24 PROCEDURE — 93005 ELECTROCARDIOGRAM TRACING: CPT

## 2025-02-24 PROCEDURE — 85025 COMPLETE CBC W/AUTO DIFF WBC: CPT

## 2025-02-24 PROCEDURE — 99285 EMERGENCY DEPT VISIT HI MDM: CPT

## 2025-02-24 PROCEDURE — 96374 THER/PROPH/DIAG INJ IV PUSH: CPT | Mod: XU

## 2025-02-24 PROCEDURE — 80053 COMPREHEN METABOLIC PANEL: CPT

## 2025-02-24 PROCEDURE — 93010 ELECTROCARDIOGRAM REPORT: CPT

## 2025-02-24 RX ORDER — ONDANSETRON 4 MG/1
4 TABLET, ORALLY DISINTEGRATING ORAL ONCE
Refills: 0 | Status: COMPLETED | OUTPATIENT
Start: 2025-02-24 | End: 2025-02-24

## 2025-02-24 RX ORDER — BACTERIOSTATIC SODIUM CHLORIDE 0.9 %
1000 VIAL (ML) INJECTION ONCE
Refills: 0 | Status: COMPLETED | OUTPATIENT
Start: 2025-02-24 | End: 2025-02-24

## 2025-02-24 RX ADMIN — Medication 1000 MILLILITER(S): at 14:47

## 2025-02-24 RX ADMIN — ONDANSETRON 4 MILLIGRAM(S): 4 TABLET, ORALLY DISINTEGRATING ORAL at 14:46

## 2025-02-24 NOTE — ED PROVIDER NOTE - PHYSICAL EXAMINATION
GENERAL: Well-developed; well-nourished; in no acute distress.   SKIN: warm, dry, no rashes   HEAD: Normocephalic; atraumatic.  EYES: PERRLA, EOMI, no conjunctival erythema  ENT: No nasal discharge; airway clear. MMM  NECK: Supple; non tender.  CARD: Regular rate and rhythm. S1, S2 normal; no murmurs, gallops, or rubs.   RESP: LCTAB; No wheezes, rales, rhonchi, or stridor.  ABD: obese, soft, mild LLQ tenderness, nondistended  RECTAL: brown stool with some dried blood (Dr. Dowell and PCA present for exam)  EXT: Normal ROM.  No LE TTP or edema bilaterally.  NEURO: A/ox3, grossly unremarkable  PSYCH: Cooperative, appropriate.

## 2025-02-24 NOTE — ED PROVIDER NOTE - OBJECTIVE STATEMENT
84-year-old male, past medical history of HTN, HLD, DM, CAD, CHF, CKD, atrial fibrillation on Eliquis, asthma, SAMMI, presents to the ED with 2 episodes of painless rectal bleeding onset yesterday.  States he has been constipated and has rectal bleeding on occasion.  Denies fever, chills, nausea, vomiting, diarrhea, abdominal pain, rectal pain.

## 2025-02-24 NOTE — ED PROVIDER NOTE - NSFOLLOWUPINSTRUCTIONS_ED_ALL_ED_FT
FOLLOW UP WITH YOUR GATROENTEROLOGIST THIS WEEK FOR REEVALUATION.     RETURN TO ED IMMEDIATELY WITH ANY WORSENING SYMPTOMS, CHEST PAIN, SHORTNESS OF BREATH, ABDOMINAL PAIN, FEVERS, WEAKNESS, DIZZINESS, PERSISTENT OR SEVERE HEADACHE OR ANY OTHER CONCERNS.    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 Please follow up with your GI doctor for MRI/MRCP. Sometimes these lesions can be cancerous or precancerous.     Rectal Bleeding      Rectal bleeding is when blood comes out of the opening of the butt (anus). People with this kind of bleeding may notice bright red blood in their underwear or in the toilet after they poop (have a bowel movement). They may also have dark red or black poop (stool). Rectal bleeding is often a sign that something is wrong. It needs to be checked by a doctor.      Follow these instructions at home:    Watch for any changes in your condition. Take these actions to help with bleeding and discomfort:  Eat a diet that is high in fiber. This will keep your poop soft so it is easier for you to poop without pushing too hard. Ask your doctor to tell you what foods and drinks are high in fiber.  Drink enough fluid to keep your pee (urine) clear or pale yellow. This also helps keep your poop soft.  Try taking a warm bath. This may help with pain.  Keep all follow-up visits as told by your doctor. This is important.      Get help right away if:    You have new bleeding.  ImageYou have more bleeding than before.  You have black or dark red poop.  You throw up (vomit) blood or something that looks like coffee grounds.  You have pain or tenderness in your belly (abdomen).  You have a fever.  You feel weak.  You feel sick to your stomach (nauseous).  You pass out (faint).  You have very bad pain in your butt.  You cannot poop.  This information is not intended to replace advice given to you by your health care provider. Make sure you discuss any questions you have with your health care provider.

## 2025-02-24 NOTE — ED ADULT NURSE NOTE - NSFALLHARMRISKINTERV_ED_ALL_ED
Assistance OOB with selected safe patient handling equipment if applicable/Communicate risk of Fall with Harm to all staff, patient, and family/Monitor gait and stability/Provide patient with walking aids/Provide visual cue: red socks, yellow wristband, yellow gown, etc/Reinforce activity limits and safety measures with patient and family/Bed in lowest position, wheels locked, appropriate side rails in place/Call bell, personal items and telephone in reach/Instruct patient to call for assistance before getting out of bed/chair/stretcher/Non-slip footwear applied when patient is off stretcher/[place] to call system/Physically safe environment - no spills, clutter or unnecessary equipment/Purposeful Proactive Rounding/Room/bathroom lighting operational, light cord in reach

## 2025-02-24 NOTE — ED ADULT NURSE NOTE - NS ED PATIENT SAFETY CONCERN
Author reached out to patient regarding E-advice sent to MD. Patient states she has had a productive cough for two weeks, denies fever. Patient will keep appointment at this time and will follow up with [facility] if she needs to reschedule.     Patient states she is currently at work and does not know the bowel prep she picked up from pharmacy. Patient will send portal message with bowel prep later today.   
No

## 2025-02-24 NOTE — ED PROVIDER NOTE - ATTENDING CONTRIBUTION TO CARE
painless BRBPR.   VSS.  abd NT.  MIGDALIA per resident.  Hb does not require transfusion.  Incidental findings on CT d/w pt..  f/u GI advised.  Strict return precautions discussed. I also advised discussing with PCP the issue of holding AC for 1-2 days.

## 2025-02-24 NOTE — ED PROVIDER NOTE - PROGRESS NOTE DETAILS
Dr. Serena Dowell, DO: Patient reassessed and symptoms have improved. Discussed results and patient comfortable with discharge home with close follow up.

## 2025-02-24 NOTE — ED PROVIDER NOTE - PATIENT PORTAL LINK FT
You can access the FollowMyHealth Patient Portal offered by [facility] by registering at the following website: [email or web address]. By joining [employer]’s FollowMyHealth portal, you will also be able to view your health information using other applications (apps) compatible with our system.

## 2025-04-04 ENCOUNTER — NON-APPOINTMENT (OUTPATIENT)
Age: 85
End: 2025-04-04

## 2025-04-09 ENCOUNTER — EMERGENCY (EMERGENCY)
Facility: HOSPITAL | Age: 85
LOS: 0 days | Discharge: HOME | End: 2025-04-09
Attending: EMERGENCY MEDICINE
Payer: MEDICARE

## 2025-04-09 VITALS
HEIGHT: 67 IN | HEART RATE: 98 BPM | OXYGEN SATURATION: 92 % | TEMPERATURE: 98 F | RESPIRATION RATE: 18 BRPM | WEIGHT: 199.96 LBS | DIASTOLIC BLOOD PRESSURE: 66 MMHG | SYSTOLIC BLOOD PRESSURE: 151 MMHG

## 2025-04-09 DIAGNOSIS — Z98.890 OTHER SPECIFIED POSTPROCEDURAL STATES: Chronic | ICD-10-CM

## 2025-04-09 DIAGNOSIS — Z98.61 CORONARY ANGIOPLASTY STATUS: Chronic | ICD-10-CM

## 2025-04-09 DIAGNOSIS — Z90.49 ACQUIRED ABSENCE OF OTHER SPECIFIED PARTS OF DIGESTIVE TRACT: Chronic | ICD-10-CM

## 2025-04-09 DIAGNOSIS — Z96.0 PRESENCE OF UROGENITAL IMPLANTS: Chronic | ICD-10-CM

## 2025-04-09 DIAGNOSIS — Z94.7 CORNEAL TRANSPLANT STATUS: Chronic | ICD-10-CM

## 2025-04-09 DIAGNOSIS — K59.00 CONSTIPATION, UNSPECIFIED: ICD-10-CM

## 2025-04-09 PROCEDURE — 99283 EMERGENCY DEPT VISIT LOW MDM: CPT

## 2025-04-09 RX ORDER — SALINE 7; 19 G/118ML; G/118ML
1 ENEMA RECTAL ONCE
Refills: 0 | Status: COMPLETED | OUTPATIENT
Start: 2025-04-09 | End: 2025-04-09

## 2025-04-09 RX ADMIN — SALINE 1 ENEMA: 7; 19 ENEMA RECTAL at 12:20

## 2025-04-09 NOTE — ED PROVIDER NOTE - OBJECTIVE STATEMENT
this is 85 yo male presents to ed for evaluation of constipation. patient states he did not try anything at home. patient takes colace everyday.

## 2025-04-09 NOTE — ED PROVIDER NOTE - PHYSICAL EXAMINATION
--EXAM--  VITAL SIGNS: I have reviewed vs documented at present.  CONSTITUTIONAL: Well-developed; well-nourished; in no acute distress.   SKIN: Warm and dry, no acute rash.   NECK: Supple; non tender.  CARD: S1, S2, Regular rate and rhythm.   RESP: No wheezes, rales or rhonchi.  ABD: Normal bowel sounds; soft; non-distended; non-tender.  .

## 2025-04-09 NOTE — ED PROVIDER NOTE - CLINICAL SUMMARY MEDICAL DECISION MAKING FREE TEXT BOX
Patient symptoms treated with Fleet.  Patient is able to have large bowel movement in the ED and is feeling 100% better.  Recommend patient continue stool softeners.  Patient will follow-up with GI and return if any worsening symptoms or concerns

## 2025-04-09 NOTE — ED PROVIDER NOTE - ATTENDING APP SHARED VISIT CONTRIBUTION OF CARE
84-year-old male past medical history noted presents for no bowel movement for the last 4 days.  Patient takes Colace .  No blood in his stool.  Patient straining to go.  No abdominal pain, no fever, on exam patient NAD, AO x 3, abdomen soft, nontender nondistended

## 2025-04-09 NOTE — ED PROVIDER NOTE - PATIENT PORTAL LINK FT
You can access the FollowMyHealth Patient Portal offered by [facility] by registering at the following website: [email or web address]. By joining [employer]’s FollowMyHealth portal, you will also be able to view your health information using other applications (apps) compatible with our system.

## 2025-07-24 ENCOUNTER — EMERGENCY (EMERGENCY)
Facility: HOSPITAL | Age: 85
LOS: 0 days | Discharge: HOME | End: 2025-07-25
Attending: STUDENT IN AN ORGANIZED HEALTH CARE EDUCATION/TRAINING PROGRAM
Payer: MEDICARE

## 2025-07-24 VITALS
TEMPERATURE: 96 F | SYSTOLIC BLOOD PRESSURE: 148 MMHG | OXYGEN SATURATION: 99 % | HEART RATE: 63 BPM | WEIGHT: 199.96 LBS | RESPIRATION RATE: 18 BRPM | DIASTOLIC BLOOD PRESSURE: 76 MMHG

## 2025-07-24 DIAGNOSIS — I48.91 UNSPECIFIED ATRIAL FIBRILLATION: ICD-10-CM

## 2025-07-24 DIAGNOSIS — Z90.49 ACQUIRED ABSENCE OF OTHER SPECIFIED PARTS OF DIGESTIVE TRACT: Chronic | ICD-10-CM

## 2025-07-24 DIAGNOSIS — E78.5 HYPERLIPIDEMIA, UNSPECIFIED: ICD-10-CM

## 2025-07-24 DIAGNOSIS — I50.9 HEART FAILURE, UNSPECIFIED: ICD-10-CM

## 2025-07-24 DIAGNOSIS — I25.10 ATHEROSCLEROTIC HEART DISEASE OF NATIVE CORONARY ARTERY WITHOUT ANGINA PECTORIS: ICD-10-CM

## 2025-07-24 DIAGNOSIS — Z98.890 OTHER SPECIFIED POSTPROCEDURAL STATES: Chronic | ICD-10-CM

## 2025-07-24 DIAGNOSIS — J44.9 CHRONIC OBSTRUCTIVE PULMONARY DISEASE, UNSPECIFIED: ICD-10-CM

## 2025-07-24 DIAGNOSIS — Z96.0 PRESENCE OF UROGENITAL IMPLANTS: Chronic | ICD-10-CM

## 2025-07-24 DIAGNOSIS — Z98.61 CORONARY ANGIOPLASTY STATUS: Chronic | ICD-10-CM

## 2025-07-24 DIAGNOSIS — I13.0 HYPERTENSIVE HEART AND CHRONIC KIDNEY DISEASE WITH HEART FAILURE AND STAGE 1 THROUGH STAGE 4 CHRONIC KIDNEY DISEASE, OR UNSPECIFIED CHRONIC KIDNEY DISEASE: ICD-10-CM

## 2025-07-24 DIAGNOSIS — Z79.01 LONG TERM (CURRENT) USE OF ANTICOAGULANTS: ICD-10-CM

## 2025-07-24 DIAGNOSIS — M54.50 LOW BACK PAIN, UNSPECIFIED: ICD-10-CM

## 2025-07-24 DIAGNOSIS — E11.22 TYPE 2 DIABETES MELLITUS WITH DIABETIC CHRONIC KIDNEY DISEASE: ICD-10-CM

## 2025-07-24 DIAGNOSIS — Z94.7 CORNEAL TRANSPLANT STATUS: Chronic | ICD-10-CM

## 2025-07-24 PROCEDURE — 99284 EMERGENCY DEPT VISIT MOD MDM: CPT | Mod: 25

## 2025-07-24 PROCEDURE — 72192 CT PELVIS W/O DYE: CPT

## 2025-07-24 PROCEDURE — 99284 EMERGENCY DEPT VISIT MOD MDM: CPT

## 2025-07-24 PROCEDURE — 72192 CT PELVIS W/O DYE: CPT | Mod: 26

## 2025-07-24 RX ORDER — LIDOCAINE HYDROCHLORIDE 20 MG/ML
1 JELLY TOPICAL ONCE
Refills: 0 | Status: COMPLETED | OUTPATIENT
Start: 2025-07-24 | End: 2025-07-24

## 2025-07-24 RX ADMIN — LIDOCAINE HYDROCHLORIDE 1 PATCH: 20 JELLY TOPICAL at 23:44

## 2025-07-24 NOTE — ED PROVIDER NOTE - CLINICAL SUMMARY MEDICAL DECISION MAKING FREE TEXT BOX
84-year-old male, past medical history of COPD, CHF, CKD, hypertension, diabetes, presenting for evaluation of left buttock pain.  He states that he was sitting at the edge of his time when he slid down and landed on his buttock 2 weeks ago.  He has been taking Motrin for pain and has been ambulatory but with pain.  Denies head trauma, weakness, numbness, inability ambulate, chest pain, abdominal pain, hip pain.  Imaging ordered and reviewed.  No acute fracture or dislocation.  Discussed all results.  Given return precautions and follow up outpatient with PMD.  Patient and daughter  comfortable with plan.

## 2025-07-24 NOTE — ED PROVIDER NOTE - NSFOLLOWUPINSTRUCTIONS_ED_ALL_ED_FT
Fall Prevention in the Home, Adult  Falls can cause injuries and can happen to people of all ages. There are many things you can do to make your home safer and to help prevent falls.    What actions can I take to prevent falls?  General information    Use good lighting in all rooms. Make sure to:  Replace any light bulbs that burn out.  Turn on the lights in dark areas and use night-lights.  Keep items that you use often in easy-to-reach places. Lower the shelves around your home if needed.  Move furniture so that there are clear paths around it.  Do not use throw rugs or other things on the floor that can make you trip.  If any of your floors are uneven, fix them.  Add color or contrast paint or tape to clearly william and help you see:  Grab bars or handrails.  First and last steps of staircases.  Where the edge of each step is.  If you use a ladder or stepladder:  Make sure that it is fully opened. Do not climb a closed ladder.  Make sure the sides of the ladder are locked in place.  Have someone hold the ladder while you use it.  Know where your pets are as you move through your home.  What can I do in the bathroom?    A grab bar next to a toilet.  Shower and bathtub, showing safety grab bars on the walls.  Keep the floor dry. Clean up any water on the floor right away.  Remove soap buildup in the bathtub or shower. Buildup makes bathtubs and showers slippery.  Use non-skid mats or decals on the floor of the bathtub or shower.  Attach bath mats securely with double-sided, non-slip rug tape.  If you need to sit down in the shower, use a non-slip stool.  Install grab bars by the toilet and in the bathtub and shower. Do not use towel bars as grab bars.  What can I do in the bedroom?    Make sure that you have a light by your bed that is easy to reach.  Do not use any sheets or blankets on your bed that hang to the floor.  Have a firm chair or bench with side arms that you can use for support when you get dressed.  What can I do in the kitchen?    Clean up any spills right away.  If you need to reach something above you, use a step stool with a grab bar.  Keep electrical cords out of the way.  Do not use floor polish or wax that makes floors slippery.  What can I do with my stairs?    Do not leave anything on the stairs.  Make sure that you have a light switch at the top and the bottom of the stairs.  Make sure that there are handrails on both sides of the stairs. Fix handrails that are broken or loose.  Install non-slip stair treads on all your stairs if they do not have carpet.  Avoid having throw rugs at the top or bottom of the stairs.  Choose a carpet that does not hide the edge of the steps on the stairs. Make sure that the carpet is firmly attached to the stairs. Fix carpet that is loose or worn.  What can I do on the outside of my home?    Use bright outdoor lighting.  Fix the edges of walkways and driveways and fix any cracks. Clear paths of anything that can make you trip, such as tools or rocks.  Add color or contrast paint or tape to clearly william and help you see anything that might make you trip as you walk through a door, such as a raised step or threshold.  Trim any bushes or trees on paths to your home.  Check to see if handrails are loose or broken and that both sides of all steps have handrails. Install guardrails along the edges of any raised decks and porches.  Have leaves, snow, or ice cleared regularly. Use sand, salt, or ice melter on paths if you live where there is ice and snow during the winter.  Clean up any spills in your garage right away. This includes grease or oil spills.  What other actions can I take?    Review your medicines with your doctor. Some medicines can cause dizziness or changes in blood pressure, which increase your risk of falling.  Wear shoes that:  Have a low heel. Do not wear high heels.  Have rubber bottoms and are closed at the toe.  Feel good on your feet and fit well.  Use tools that help you move around if needed. These include:  Canes.  Walkers.  Scooters.  Crutches.  Ask your doctor what else you can do to help prevent falls. This may include seeing a physical therapist to learn to do exercises to move better and get stronger.  Where to find more information  Centers for Disease Control and Prevention, [place]: cdc.gov  National [place] on Aging: linsey.nih.gov  National [place] on Aging: linsey.nih.gov  Contact a doctor if:  You are afraid of falling at home.  You feel weak, drowsy, or dizzy at home.  You fall at home.  Get help right away if you:  Lose consciousness or have trouble moving after a fall.  Have a fall that causes a head injury.  These symptoms may be an emergency. Get help right away. Call 911.  Do not wait to see if the symptoms will go away.  Do not drive yourself to the hospital.  This information is not intended to replace advice given to you by your health care provider. Make sure you discuss any questions you have with your health care provider.

## 2025-07-24 NOTE — ED PROVIDER NOTE - ATTENDING CONTRIBUTION TO CARE
84-year-old male, past medical history of COPD, CHF, CKD, hypertension, diabetes, presenting for evaluation of left buttock pain.  He states that he was sitting at the edge of his time when he slid down and landed on his buttock 2 weeks ago.  He has been taking Motrin for pain and has been ambulatory but with pain.  Denies head trauma, weakness, numbness, inability ambulate, chest pain, abdominal pain, hip pain.    CONSTITUTIONAL: Well-developed; well-nourished; in no acute distress.   SKIN: warm, dry  HEAD: Normocephalic  EYES: no conjunctival erythema  ENT: No nasal discharge; airway clear.  NECK: Supple  BACK: no midline or paraspinal tenderness  ABD: soft ntnd  BUTTOCK: left buttock tenderness, no skin changes, no swelling. no pelvic instability. no hip tenderness.  EXT: Normal ROM.  No clubbing, cyanosis or edema. Ambulates independently  NEURO: Alert, oriented, grossly unremarkable  PSYCH: Cooperative, appropriate.

## 2025-07-24 NOTE — ED PROVIDER NOTE - PHYSICAL EXAMINATION
CONSTITUTIONAL: well-appearing, well nourished, non-toxic, NAD  HEAD: NCAT  EYES: EOMI, no scleral icterus  NECK: Full ROM.  DALLIN: No tenderness  EXT: Full ROM, no bony tenderness, Left buttock tenderness  NEURO: normal motor. normal sensory. Normal gait.  PSYCH: Cooperative, appropriate.

## 2025-07-24 NOTE — ED PROVIDER NOTE - OBJECTIVE STATEMENT
84-year-old male with history of HTN, HLD, CKD, CHF, CAD, DM, COPD, and A-fib on Eliquis presents for evaluation for left buttock pain.  Patient states that he slipped and fell down 2 weeks in his bathtub onto his left buttock.  Without head trauma since then, patient has had pain, but has been able to ambulate.  He has been taking Motrin with improvement.  He is concerned that the pain is still present, but denies numbness, tingling, strength or other injuries.

## 2025-07-24 NOTE — ED PROVIDER NOTE - PATIENT PORTAL LINK FT
You can access the FollowMyHealth Patient Portal offered by [facility] by registering at the following website: [email or web address]. By joining [employer]’s FollowMyHealth portal, you will also be able to view your health information using other applications (apps) compatible with our system.

## 2025-07-24 NOTE — ED PROVIDER NOTE - CARE PROVIDER_API CALL
Brandon Arizmendi ()  Internal Medicine  [address] [place] [id number]  Phone: [phone]  Fax: [phone]  Follow Up Time: 1-3 Days

## 2025-07-24 NOTE — ED ADULT TRIAGE NOTE - WEIGHT IN KG
BG goal 140-180    Levemir 14 units daily.  Increase Novolog to 3 units every 4 hours with TF; hold if TF held   Low dose correction scale  BG monitoring every 4 hours    Discharge planning: TBD     90.7

## 2025-07-25 RX ORDER — LIDOCAINE HYDROCHLORIDE 20 MG/ML
1 JELLY TOPICAL
Qty: 1 | Refills: 0
Start: 2025-07-25 | End: 2025-07-29

## 2025-08-05 ENCOUNTER — NON-APPOINTMENT (OUTPATIENT)
Age: 85
End: 2025-08-05

## 2025-09-05 ENCOUNTER — EMERGENCY (EMERGENCY)
Facility: HOSPITAL | Age: 85
LOS: 0 days | Discharge: HOME | End: 2025-09-05
Attending: EMERGENCY MEDICINE

## 2025-09-05 VITALS
OXYGEN SATURATION: 93 % | WEIGHT: 210.1 LBS | DIASTOLIC BLOOD PRESSURE: 75 MMHG | HEART RATE: 95 BPM | HEIGHT: 68 IN | RESPIRATION RATE: 18 BRPM | TEMPERATURE: 99 F | SYSTOLIC BLOOD PRESSURE: 134 MMHG

## 2025-09-05 DIAGNOSIS — Z98.890 OTHER SPECIFIED POSTPROCEDURAL STATES: Chronic | ICD-10-CM

## 2025-09-05 DIAGNOSIS — Z94.7 CORNEAL TRANSPLANT STATUS: Chronic | ICD-10-CM

## 2025-09-05 DIAGNOSIS — Z96.0 PRESENCE OF UROGENITAL IMPLANTS: Chronic | ICD-10-CM

## 2025-09-05 DIAGNOSIS — Z90.49 ACQUIRED ABSENCE OF OTHER SPECIFIED PARTS OF DIGESTIVE TRACT: Chronic | ICD-10-CM

## 2025-09-05 DIAGNOSIS — Z98.61 CORONARY ANGIOPLASTY STATUS: Chronic | ICD-10-CM

## 2025-09-05 PROCEDURE — 99284 EMERGENCY DEPT VISIT MOD MDM: CPT

## 2025-09-05 PROCEDURE — 99283 EMERGENCY DEPT VISIT LOW MDM: CPT

## 2025-09-05 RX ORDER — DOXYCYCLINE HYCLATE 100 MG
1 TABLET ORAL
Qty: 14 | Refills: 0
Start: 2025-09-05 | End: 2025-09-11

## (undated) DEVICE — VENODYNE/SCD SLEEVE CALF MEDIUM

## (undated) DEVICE — DRSG TAPE UMBILICAL COTTON 2" X 30 X 1/8"

## (undated) DEVICE — WARMING BLANKET UPPER ADULT

## (undated) DEVICE — DRSG DERMABOND 0.7ML

## (undated) DEVICE — DRAPE 3/4 SHEET 52X76"

## (undated) DEVICE — DRSG TAPE MICROFOAM 3"

## (undated) DEVICE — SUPP ATHLETIC MALE XLG 44-55IN

## (undated) DEVICE — GLV 9 PROTEXIS (WHITE)

## (undated) DEVICE — DRAIN URINARY LEG BAG WITH FLIP-FLO VALVE 32OZ

## (undated) DEVICE — SUT PROLENE 4-0 30" KS

## (undated) DEVICE — PACK PROST PENILE LNX SURGICOUNT

## (undated) DEVICE — SUT PDS II 3-0 27" RB-1

## (undated) DEVICE — SUT VICRYL 3-0 27" RB-1 UNDYED

## (undated) DEVICE — DRAPE SURGICAL #1010

## (undated) DEVICE — LONE STAR DILAMEZINSERT INSERTER BLUE 12MM